# Patient Record
Sex: MALE | Race: WHITE | NOT HISPANIC OR LATINO
[De-identification: names, ages, dates, MRNs, and addresses within clinical notes are randomized per-mention and may not be internally consistent; named-entity substitution may affect disease eponyms.]

---

## 2019-02-17 ENCOUNTER — FORM ENCOUNTER (OUTPATIENT)
Age: 59
End: 2019-02-17

## 2019-02-18 ENCOUNTER — APPOINTMENT (OUTPATIENT)
Dept: INTERNAL MEDICINE | Facility: CLINIC | Age: 59
End: 2019-02-18
Payer: COMMERCIAL

## 2019-02-18 ENCOUNTER — APPOINTMENT (OUTPATIENT)
Dept: RADIOLOGY | Facility: CLINIC | Age: 59
End: 2019-02-18

## 2019-02-18 ENCOUNTER — OUTPATIENT (OUTPATIENT)
Dept: OUTPATIENT SERVICES | Facility: HOSPITAL | Age: 59
LOS: 1 days | End: 2019-02-18
Payer: COMMERCIAL

## 2019-02-18 ENCOUNTER — APPOINTMENT (OUTPATIENT)
Dept: ULTRASOUND IMAGING | Facility: CLINIC | Age: 59
End: 2019-02-18

## 2019-02-18 VITALS
SYSTOLIC BLOOD PRESSURE: 130 MMHG | OXYGEN SATURATION: 97 % | TEMPERATURE: 98.1 F | RESPIRATION RATE: 16 BRPM | WEIGHT: 238 LBS | HEART RATE: 80 BPM | HEIGHT: 69 IN | DIASTOLIC BLOOD PRESSURE: 78 MMHG | BODY MASS INDEX: 35.25 KG/M2

## 2019-02-18 DIAGNOSIS — I51.7 CARDIOMEGALY: ICD-10-CM

## 2019-02-18 DIAGNOSIS — I37.1 NONRHEUMATIC PULMONARY VALVE INSUFFICIENCY: ICD-10-CM

## 2019-02-18 DIAGNOSIS — I45.10 UNSPECIFIED RIGHT BUNDLE-BRANCH BLOCK: ICD-10-CM

## 2019-02-18 DIAGNOSIS — Z00.8 ENCOUNTER FOR OTHER GENERAL EXAMINATION: ICD-10-CM

## 2019-02-18 DIAGNOSIS — E66.2 MORBID (SEVERE) OBESITY WITH ALVEOLAR HYPOVENTILATION: ICD-10-CM

## 2019-02-18 DIAGNOSIS — I07.1 RHEUMATIC TRICUSPID INSUFFICIENCY: ICD-10-CM

## 2019-02-18 DIAGNOSIS — Z78.9 OTHER SPECIFIED HEALTH STATUS: ICD-10-CM

## 2019-02-18 DIAGNOSIS — N20.0 CALCULUS OF KIDNEY: ICD-10-CM

## 2019-02-18 DIAGNOSIS — Z87.19 PERSONAL HISTORY OF OTHER DISEASES OF THE DIGESTIVE SYSTEM: ICD-10-CM

## 2019-02-18 DIAGNOSIS — Z83.79 FAMILY HISTORY OF OTHER DISEASES OF THE DIGESTIVE SYSTEM: ICD-10-CM

## 2019-02-18 DIAGNOSIS — R10.9 UNSPECIFIED ABDOMINAL PAIN: ICD-10-CM

## 2019-02-18 DIAGNOSIS — Z87.898 PERSONAL HISTORY OF OTHER SPECIFIED CONDITIONS: ICD-10-CM

## 2019-02-18 DIAGNOSIS — R17 UNSPECIFIED JAUNDICE: ICD-10-CM

## 2019-02-18 LAB
BILIRUB UR QL STRIP: NEGATIVE
CLARITY UR: CLEAR
COLLECTION METHOD: NORMAL
GLUCOSE UR-MCNC: NORMAL
HCG UR QL: 2 EU/DL
HGB UR QL STRIP.AUTO: NORMAL
KETONES UR-MCNC: NEGATIVE
LEUKOCYTE ESTERASE UR QL STRIP: NORMAL
NITRITE UR QL STRIP: NEGATIVE
PH UR STRIP: 6
PROT UR STRIP-MCNC: NEGATIVE
SP GR UR STRIP: 1.02

## 2019-02-18 PROCEDURE — 93970 EXTREMITY STUDY: CPT

## 2019-02-18 PROCEDURE — 81003 URINALYSIS AUTO W/O SCOPE: CPT | Mod: QW

## 2019-02-18 PROCEDURE — 76700 US EXAM ABDOM COMPLETE: CPT | Mod: 26

## 2019-02-18 PROCEDURE — 99214 OFFICE O/P EST MOD 30 MIN: CPT | Mod: 25

## 2019-02-18 PROCEDURE — 93970 EXTREMITY STUDY: CPT | Mod: 26

## 2019-02-18 PROCEDURE — 72070 X-RAY EXAM THORAC SPINE 2VWS: CPT | Mod: 26

## 2019-02-18 PROCEDURE — 76700 US EXAM ABDOM COMPLETE: CPT

## 2019-02-18 PROCEDURE — 72070 X-RAY EXAM THORAC SPINE 2VWS: CPT

## 2019-02-18 NOTE — COUNSELING
[Healthy eating counseling provided] : healthy eating [Patient motivation] : Patient motivation [Needs reinforcement, provided] : Patient needs reinforcement on understanding lifestyle changes and  the steps needed to achieve self management goals and reinforcement was provided

## 2019-02-18 NOTE — PLAN
[FreeTextEntry1] : - patient with right sided flank pain, and hematuria- advised patient the need for a CT scan of the abdomen and pelvis. Patient refused to have a CAT scan done which only agree to do an ultrasound of the abdomen. Patient was educated that the CAT scan is more sensitive for picking up renal colic.\par \par GI-fatty liver-patient has signs of jaundice-patient states this is a chronic condition. Advised again for patient to have a CT of the abdomen with and without contrast. Patient refused. Did send patient for an abdominal sonogram. Patient also refused blood test today states he seen his hematologist on Wednesday who does his blood work. Patient was given a laboratory requisition to have completed on Wednesday at his hematologist.\par Patient states the pain comes and goes but is relieved with Tylenol and using the TENS unit. Advised patient to continue with current therapy pending results of the sonogram of the abdomen\par advised weight loss.  \par Cardiovascular-review of echocardiogram done in 2018 which showed severe left atrial enlargement, left ventricular hypertrophy, tricuspid and pulmonic regurgitation with intraseptal hypertrophy. Patient did have a good ejection fraction.  Patient sees Dr. Byrd.\par Patient with significant peripheral edema. Patient states Dr. Byrd advised him that from standing on his feet all day long. Advise patient to go for a venous Doppler of bilateral lower extremities. Also ordered a BNP to be drawn.  \par Orthopedic-mid back pain the patient with significant kyphosis- ordered a thoracic spine x-ray.

## 2019-02-18 NOTE — HISTORY OF PRESENT ILLNESS
[___ Days ago] : [unfilled] days ago [Episodic] : episodic [Severe] : severe [Ice] : ice [Heat] : heat [OTC Remedies] : OTC remedies [Activity] : with activity [Stable] : stable [Spouse] : spouse [de-identified] : Flank pain  [FreeTextEntry7] : flank to mid back  area  [FreeTextEntry5] : took Tylenol with relief, TENS unit  [FreeTextEntry4] : urination  [FreeTextEntry8] : Janessa scale  1-10 is 10 when it is active.  \par Patient states that he had kidney stones in the past and then felt similar symptoms. Patient denies any recent trauma to the back area.\par Patient denied any chest pain, shortness of breath, blood in the urine, pain with urination, increased frequency of urination. States he is to drink a lot of fluids and his urine looks clear.

## 2019-02-18 NOTE — PHYSICAL EXAM
[No Acute Distress] : no acute distress [Well Nourished] : well nourished [Well Developed] : well developed [Well-Appearing] : well-appearing [Normal Sclera/Conjunctiva] : normal sclera/conjunctiva [PERRL] : pupils equal round and reactive to light [EOMI] : extraocular movements intact [Normal Outer Ear/Nose] : the outer ears and nose were normal in appearance [Normal Oropharynx] : the oropharynx was normal [No JVD] : no jugular venous distention [Supple] : supple [No Lymphadenopathy] : no lymphadenopathy [Thyroid Normal, No Nodules] : the thyroid was normal and there were no nodules present [No Respiratory Distress] : no respiratory distress  [Clear to Auscultation] : lungs were clear to auscultation bilaterally [No Accessory Muscle Use] : no accessory muscle use [Normal Rate] : normal rate  [Regular Rhythm] : with a regular rhythm [No Carotid Bruits] : no carotid bruits [No Abdominal Bruit] : a ~M bruit was not heard ~T in the abdomen [No Varicosities] : no varicosities [Pedal Pulses Present] : the pedal pulses are present [No Extremity Clubbing/Cyanosis] : no extremity clubbing/cyanosis [No Palpable Aorta] : no palpable aorta [Soft] : abdomen soft [Non Tender] : non-tender [Non-distended] : non-distended [No Masses] : no abdominal mass palpated [No HSM] : no HSM [Normal Bowel Sounds] : normal bowel sounds [Normal Posterior Cervical Nodes] : no posterior cervical lymphadenopathy [Normal Anterior Cervical Nodes] : no anterior cervical lymphadenopathy [No CVA Tenderness] : no CVA  tenderness [No Spinal Tenderness] : no spinal tenderness [Kyphosis] : kyphosis [No Joint Swelling] : no joint swelling [Grossly Normal Strength/Tone] : grossly normal strength/tone [No Rash] : no rash [Jaundice] : jaundice [Normal Gait] : normal gait [Coordination Grossly Intact] : coordination grossly intact [No Focal Deficits] : no focal deficits [Deep Tendon Reflexes (DTR)] : deep tendon reflexes were 2+ and symmetric [Normal Affect] : the affect was normal [Normal Insight/Judgement] : insight and judgment were intact [de-identified] : sclera is  jaundice  [de-identified] : S1 S2 reg with murmur RSB  [de-identified] : 3 plus edema of lower ext

## 2019-02-20 LAB — BACTERIA UR CULT: NORMAL

## 2019-02-22 ENCOUNTER — RECORD ABSTRACTING (OUTPATIENT)
Age: 59
End: 2019-02-22

## 2019-02-22 DIAGNOSIS — Z78.9 OTHER SPECIFIED HEALTH STATUS: ICD-10-CM

## 2019-02-22 LAB
ALBUMIN SERPL ELPH-MCNC: 3.6 G/DL
ALP BLD-CCNC: 96 U/L
ALT SERPL-CCNC: 36 U/L
AMYLASE/CREAT SERPL: 51 U/L
ANION GAP SERPL CALC-SCNC: 19 MMOL/L
AST SERPL-CCNC: 55 U/L
BILIRUB SERPL-MCNC: 5.1 MG/DL
BUN SERPL-MCNC: 16 MG/DL
CALCIUM SERPL-MCNC: 8.9 MG/DL
CHLORIDE SERPL-SCNC: 107 MMOL/L
CO2 SERPL-SCNC: 20 MMOL/L
CREAT SERPL-MCNC: 0.69 MG/DL
GLUCOSE SERPL-MCNC: 117 MG/DL
LPL SERPL-CCNC: 35 U/L
NT-PROBNP SERPL-MCNC: 184 PG/ML
POTASSIUM SERPL-SCNC: 4.2 MMOL/L
PROT SERPL-MCNC: 6.9 G/DL
SODIUM SERPL-SCNC: 145 MMOL/L

## 2019-02-23 ENCOUNTER — APPOINTMENT (OUTPATIENT)
Dept: ULTRASOUND IMAGING | Facility: CLINIC | Age: 59
End: 2019-02-23

## 2019-02-23 LAB
BASOPHILS # BLD AUTO: 0.04 K/UL
BASOPHILS NFR BLD AUTO: 0.9 %
EOSINOPHIL # BLD AUTO: 0.27 K/UL
EOSINOPHIL NFR BLD AUTO: 6.1 %
HCT VFR BLD CALC: 34.7 %
HGB BLD-MCNC: 11.8 G/DL
IMM GRANULOCYTES NFR BLD AUTO: 0.5 %
LYMPHOCYTES # BLD AUTO: 0.92 K/UL
LYMPHOCYTES NFR BLD AUTO: 20.8 %
MAN DIFF?: NORMAL
MCHC RBC-ENTMCNC: 34 GM/DL
MCHC RBC-ENTMCNC: 37.6 PG
MCV RBC AUTO: 110.5 FL
MONOCYTES # BLD AUTO: 0.4 K/UL
MONOCYTES NFR BLD AUTO: 9 %
NEUTROPHILS # BLD AUTO: 2.78 K/UL
NEUTROPHILS NFR BLD AUTO: 62.7 %
PLATELET # BLD AUTO: 70 K/UL
RBC # BLD: 3.14 M/UL
RBC # FLD: 16.8 %
WBC # FLD AUTO: 4.59 K/UL

## 2019-02-24 ENCOUNTER — EMERGENCY (EMERGENCY)
Facility: HOSPITAL | Age: 59
LOS: 1 days | Discharge: HOME | End: 2019-02-24
Attending: EMERGENCY MEDICINE
Payer: COMMERCIAL

## 2019-02-24 VITALS
HEART RATE: 84 BPM | WEIGHT: 229.94 LBS | DIASTOLIC BLOOD PRESSURE: 76 MMHG | SYSTOLIC BLOOD PRESSURE: 127 MMHG | OXYGEN SATURATION: 97 % | RESPIRATION RATE: 20 BRPM | HEIGHT: 68 IN | TEMPERATURE: 98 F

## 2019-02-24 VITALS
HEART RATE: 84 BPM | TEMPERATURE: 99 F | SYSTOLIC BLOOD PRESSURE: 125 MMHG | RESPIRATION RATE: 17 BRPM | DIASTOLIC BLOOD PRESSURE: 78 MMHG | OXYGEN SATURATION: 98 %

## 2019-02-24 LAB
ALBUMIN SERPL ELPH-MCNC: 3.5 G/DL — SIGNIFICANT CHANGE UP (ref 3.3–5)
ALP SERPL-CCNC: 82 U/L — SIGNIFICANT CHANGE UP (ref 40–120)
ALT FLD-CCNC: 33 U/L — SIGNIFICANT CHANGE UP (ref 10–45)
ANION GAP SERPL CALC-SCNC: 13 MMOL/L — SIGNIFICANT CHANGE UP (ref 5–17)
APPEARANCE UR: CLEAR — SIGNIFICANT CHANGE UP
AST SERPL-CCNC: 51 U/L — HIGH (ref 10–40)
BACTERIA # UR AUTO: NEGATIVE — SIGNIFICANT CHANGE UP
BASOPHILS # BLD AUTO: 0 K/UL — SIGNIFICANT CHANGE UP (ref 0–0.2)
BASOPHILS NFR BLD AUTO: 0.6 % — SIGNIFICANT CHANGE UP (ref 0–2)
BILIRUB SERPL-MCNC: 8.3 MG/DL — HIGH (ref 0.2–1.2)
BILIRUB UR-MCNC: NEGATIVE — SIGNIFICANT CHANGE UP
BUN SERPL-MCNC: 19 MG/DL — SIGNIFICANT CHANGE UP (ref 7–23)
CALCIUM SERPL-MCNC: 8.9 MG/DL — SIGNIFICANT CHANGE UP (ref 8.4–10.5)
CHLORIDE SERPL-SCNC: 99 MMOL/L — SIGNIFICANT CHANGE UP (ref 96–108)
CO2 SERPL-SCNC: 23 MMOL/L — SIGNIFICANT CHANGE UP (ref 22–31)
COLOR SPEC: ABNORMAL
CREAT SERPL-MCNC: 1.08 MG/DL — SIGNIFICANT CHANGE UP (ref 0.5–1.3)
DIFF PNL FLD: ABNORMAL
EOSINOPHIL # BLD AUTO: 0.3 K/UL — SIGNIFICANT CHANGE UP (ref 0–0.5)
EOSINOPHIL NFR BLD AUTO: 3.3 % — SIGNIFICANT CHANGE UP (ref 0–6)
EPI CELLS # UR: 0 /HPF — SIGNIFICANT CHANGE UP
GAS PNL BLDV: SIGNIFICANT CHANGE UP
GLUCOSE SERPL-MCNC: 95 MG/DL — SIGNIFICANT CHANGE UP (ref 70–99)
GLUCOSE UR QL: NEGATIVE — SIGNIFICANT CHANGE UP
HCT VFR BLD CALC: 31.3 % — LOW (ref 39–50)
HGB BLD-MCNC: 11.3 G/DL — LOW (ref 13–17)
HYALINE CASTS # UR AUTO: 0 /LPF — SIGNIFICANT CHANGE UP (ref 0–2)
KETONES UR-MCNC: NEGATIVE — SIGNIFICANT CHANGE UP
LEUKOCYTE ESTERASE UR-ACNC: ABNORMAL
LIDOCAIN IGE QN: 22 U/L — SIGNIFICANT CHANGE UP (ref 7–60)
LYMPHOCYTES # BLD AUTO: 1.2 K/UL — SIGNIFICANT CHANGE UP (ref 1–3.3)
LYMPHOCYTES # BLD AUTO: 15.9 % — SIGNIFICANT CHANGE UP (ref 13–44)
MCHC RBC-ENTMCNC: 36.1 GM/DL — HIGH (ref 32–36)
MCHC RBC-ENTMCNC: 38.3 PG — HIGH (ref 27–34)
MCV RBC AUTO: 106 FL — HIGH (ref 80–100)
MONOCYTES # BLD AUTO: 0.8 K/UL — SIGNIFICANT CHANGE UP (ref 0–0.9)
MONOCYTES NFR BLD AUTO: 10.3 % — SIGNIFICANT CHANGE UP (ref 2–14)
NEUTROPHILS # BLD AUTO: 5.3 K/UL — SIGNIFICANT CHANGE UP (ref 1.8–7.4)
NEUTROPHILS NFR BLD AUTO: 69.8 % — SIGNIFICANT CHANGE UP (ref 43–77)
NITRITE UR-MCNC: NEGATIVE — SIGNIFICANT CHANGE UP
PH UR: 6.5 — SIGNIFICANT CHANGE UP (ref 5–8)
PLATELET # BLD AUTO: 65 K/UL — LOW (ref 150–400)
POTASSIUM SERPL-MCNC: 3.7 MMOL/L — SIGNIFICANT CHANGE UP (ref 3.5–5.3)
POTASSIUM SERPL-SCNC: 3.7 MMOL/L — SIGNIFICANT CHANGE UP (ref 3.5–5.3)
PROT SERPL-MCNC: 6.8 G/DL — SIGNIFICANT CHANGE UP (ref 6–8.3)
PROT UR-MCNC: ABNORMAL
RBC # BLD: 2.95 M/UL — LOW (ref 4.2–5.8)
RBC # FLD: 15.3 % — HIGH (ref 10.3–14.5)
RBC CASTS # UR COMP ASSIST: 26 /HPF — HIGH (ref 0–4)
SODIUM SERPL-SCNC: 135 MMOL/L — SIGNIFICANT CHANGE UP (ref 135–145)
SP GR SPEC: 1.01 — LOW (ref 1.01–1.02)
UROBILINOGEN FLD QL: ABNORMAL
WBC # BLD: 7.6 K/UL — SIGNIFICANT CHANGE UP (ref 3.8–10.5)
WBC # FLD AUTO: 7.6 K/UL — SIGNIFICANT CHANGE UP (ref 3.8–10.5)
WBC UR QL: 36 /HPF — HIGH (ref 0–5)

## 2019-02-24 PROCEDURE — 82803 BLOOD GASES ANY COMBINATION: CPT

## 2019-02-24 PROCEDURE — 82435 ASSAY OF BLOOD CHLORIDE: CPT

## 2019-02-24 PROCEDURE — 82330 ASSAY OF CALCIUM: CPT

## 2019-02-24 PROCEDURE — 81001 URINALYSIS AUTO W/SCOPE: CPT

## 2019-02-24 PROCEDURE — 87086 URINE CULTURE/COLONY COUNT: CPT

## 2019-02-24 PROCEDURE — 85027 COMPLETE CBC AUTOMATED: CPT

## 2019-02-24 PROCEDURE — 99284 EMERGENCY DEPT VISIT MOD MDM: CPT

## 2019-02-24 PROCEDURE — 74176 CT ABD & PELVIS W/O CONTRAST: CPT | Mod: 26

## 2019-02-24 PROCEDURE — 83690 ASSAY OF LIPASE: CPT

## 2019-02-24 PROCEDURE — 84132 ASSAY OF SERUM POTASSIUM: CPT

## 2019-02-24 PROCEDURE — 80053 COMPREHEN METABOLIC PANEL: CPT

## 2019-02-24 PROCEDURE — 74176 CT ABD & PELVIS W/O CONTRAST: CPT

## 2019-02-24 PROCEDURE — 85014 HEMATOCRIT: CPT

## 2019-02-24 PROCEDURE — 82947 ASSAY GLUCOSE BLOOD QUANT: CPT

## 2019-02-24 PROCEDURE — 84295 ASSAY OF SERUM SODIUM: CPT

## 2019-02-24 PROCEDURE — 83605 ASSAY OF LACTIC ACID: CPT

## 2019-02-24 RX ORDER — OXYCODONE HYDROCHLORIDE 5 MG/1
1 TABLET ORAL
Qty: 12 | Refills: 0
Start: 2019-02-24 | End: 2019-02-26

## 2019-02-24 RX ORDER — TAMSULOSIN HYDROCHLORIDE 0.4 MG/1
1 CAPSULE ORAL
Qty: 7 | Refills: 0
Start: 2019-02-24 | End: 2019-03-02

## 2019-02-24 NOTE — ED PROVIDER NOTE - PROGRESS NOTE DETAILS
CT shows 5mm UVJ stone. pt feels well, tolerating PO. wants to go home. will follow up with urology.

## 2019-02-24 NOTE — ED ADULT NURSE NOTE - OBJECTIVE STATEMENT
59 y.o M arrived to ED c/o R sided flank pain intermittent x1week. A&Ox3, ambulatory. has h/o of kidney stones in the past, states pain feels similar. no N/V/D. no fevers. respirations nonlabored pt in no acute distress. endorses intermittent pain radiation to abdomen. endorses "occasional" pain with urination. + R sided CVA tenderness. neuro intact able to move all extremities. Denies CP, SOB, HA, dizziness. Safety and comfort provided/maintained. Spouse at bedside

## 2019-02-24 NOTE — ED PROVIDER NOTE - OBJECTIVE STATEMENT
Keily Del Valle M.D: 59M hx surya (baseline bili 5) and essential thrombocytopenia (baseline plt 70) p/w 1 week of intermittent right flank pain radiating to rlq associated with nausea and chills. has also had hematuria with this but no dysuria no fveers. saw pmd has renal sono which was normal and has urine showing hematuria but no infection. has been taking tylenol for pain with some relief. feels similar to prior kidney stone he has had in the past.

## 2019-02-24 NOTE — ED PROVIDER NOTE - ATTENDING CONTRIBUTION TO CARE
****ATTENDING**** 58yo male hx listed presents with R flank pain x 1 week. Pain is sharp and dull and intermittent in nature. +nausea . Nml bms. No fever or chills. Pt saw his PCP had outpt renal sono wo any findings.   On exam, Patient is awake,alert,oriented x 3. Patient is well appearing and in no acute distress. Patient's chest is clear to ausculation, +s1s2. Abdomen is soft nd/nt +BS. Extremity with no swelling or calf tenderness. R cvat mild tender.  Check labs, CT and UA to eval for stone. Pt declined pain meds.

## 2019-02-24 NOTE — ED PROVIDER NOTE - NSFOLLOWUPINSTRUCTIONS_ED_ALL_ED_FT
take tylenol 975mg every 6 hours for pain  take oxycodone as needed up to 4 times a day for break through pain  take flomax nightly  stay well hydrated  return to er for new or worsening symptoms  follow up with urology- dr danielson.

## 2019-02-24 NOTE — ED PROVIDER NOTE - TEMPLATE, MLM
[>50% of Time Spent on Counseling for ____] : Greater than 50% of the encounter time was spent on counseling for [unfilled] [Time Spent: ___ minutes] : I have spent [unfilled] minutes of face to face time with the patient Abdominal Pain, N/V/D

## 2019-02-24 NOTE — ED PROVIDER NOTE - PHYSICAL EXAMINATION
Keily Del Valle M.D.:   patient awake alert NAD .   LUNGS CTAB no wheeze no crackle.   CARD RRR no m/r/g.    Abdomen soft minimal ruq tenderness without mohr sign ND no rebound no guarding +R CVAT.   EXT WWP no edema no calf tenderness CV 2+DP/PT bilaterally.   neuro A&Ox3 gait normal.    skin warm and dry no rash +jaundice  HEENT: moist mucous membranes, PERRL, EOMI

## 2019-02-25 LAB
CULTURE RESULTS: NO GROWTH — SIGNIFICANT CHANGE UP
SPECIMEN SOURCE: SIGNIFICANT CHANGE UP

## 2019-02-27 ENCOUNTER — APPOINTMENT (OUTPATIENT)
Dept: UROLOGY | Facility: CLINIC | Age: 59
End: 2019-02-27
Payer: COMMERCIAL

## 2019-02-27 PROBLEM — E80.4 GILBERT SYNDROME: Chronic | Status: ACTIVE | Noted: 2019-02-24

## 2019-02-27 LAB
BILIRUB UR QL STRIP: NORMAL
CLARITY UR: NORMAL
COLLECTION METHOD: NORMAL
GLUCOSE UR-MCNC: NEGATIVE
HCG UR QL: 4 EU/DL
HGB UR QL STRIP.AUTO: NORMAL
KETONES UR-MCNC: NEGATIVE
LEUKOCYTE ESTERASE UR QL STRIP: NEGATIVE
NITRITE UR QL STRIP: NEGATIVE
PH UR STRIP: 6
PROT UR STRIP-MCNC: NORMAL
SP GR UR STRIP: 1.02

## 2019-02-27 PROCEDURE — 99203 OFFICE O/P NEW LOW 30 MIN: CPT

## 2019-02-27 NOTE — HISTORY OF PRESENT ILLNESS
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  Third stone episode-  approx 20 years apart.  No additional stones.  Passing some gravel.\par \par Doing well now.  No fever.  No sig urge/frequency.\par  [Flank Pain] : flank pain

## 2019-02-27 NOTE — REVIEW OF SYSTEMS
[Eyesight Problems] : eyesight problems [Negative] : Heme/Lymph [Seen by urologist before (Name)  ___] : Preciously seen by a urologist: [unfilled] [Told you have blood in urine on a urine test] : told blood was present in a urine test [History of kidney stones] : history of kidney stones [Wake up at night to urinate  How many times?  ___] : wakes up to urinate [unfilled] times during the night

## 2019-02-27 NOTE — ASSESSMENT
[FreeTextEntry1] : Films reviewed.\par I had long discussion with patient about their stones, and about options, risks, and benefits of all treatments.  Main options for definitive stone treatment include ESWL, URS, PCNL.  \par \par ESWL success best with smaller, less dense stones, and with short skin to stone distance and favorable location of the stone within the urinary tract, while URS is more successful treatment with multiple stones, more dense stones, or challenging body habitus or stone location.  PCNL is best option for larger, more dense and complex stones, and particularly those involving the lower pole.  Non-definitive stone treatment options for drainage, using either stents or nephrostomy, also reviewed: these are of lower immediate surgical risks, but incur multiple procedures to manage and may have their own complications and effects on quality of life.  Still, nephrostomy or nephroureteral catheter can allow maintenance of urinary system drainage without surgical risks, and management in office with exchanges (avoiding the anesthesia and testing which would be present with bilateral internal stent exchange).\par \par Risks of nontreatment with obstruction can lead to very high rate of renal function loss in stone-bearing kidney over the next months to years.\par \par In this patient's case, I recommend... observation for spont passage, or URS.\par \par Risks/benefits/success/recovery expectations all reviewed at length, particularly with respect to patient's comorbidities, and inclusive of infection/sepsis, bleeding, need for secondary procedures or secondary stages such as embolization or open surgery, and even risks of death due to acute issues superimposed on comorbidities.\par \par Pt prefers to undergo: spont passage.\par \par Cont flomax.\par Urine culture\par Will observe at this time- RTC approx 3 weeks, will plan repeat imaging based on whether stone passes observed, or not

## 2019-02-27 NOTE — PHYSICAL EXAM
[General Appearance - Well Developed] : well developed [General Appearance - Well Nourished] : well nourished [Normal Appearance] : normal appearance [Well Groomed] : well groomed [General Appearance - In No Acute Distress] : no acute distress [Edema] : no peripheral edema [Respiration, Rhythm And Depth] : normal respiratory rhythm and effort [Exaggerated Use Of Accessory Muscles For Inspiration] : no accessory muscle use [Abdomen Soft] : soft [Abdomen Tenderness] : non-tender [Urinary Bladder Findings] : the bladder was normal on palpation [FreeTextEntry1] : remainder  [Normal Station and Gait] : the gait and station were normal for the patient's age [] : no rash [No Focal Deficits] : no focal deficits [Oriented To Time, Place, And Person] : oriented to person, place, and time [Affect] : the affect was normal [Mood] : the mood was normal [Not Anxious] : not anxious [No Palpable Adenopathy] : no palpable adenopathy

## 2019-03-01 LAB — BACTERIA UR CULT: NORMAL

## 2019-03-04 ENCOUNTER — APPOINTMENT (OUTPATIENT)
Dept: UROLOGY | Facility: CLINIC | Age: 59
End: 2019-03-04

## 2019-03-06 ENCOUNTER — APPOINTMENT (OUTPATIENT)
Dept: INTERNAL MEDICINE | Facility: CLINIC | Age: 59
End: 2019-03-06
Payer: COMMERCIAL

## 2019-03-06 VITALS
TEMPERATURE: 98.2 F | DIASTOLIC BLOOD PRESSURE: 78 MMHG | OXYGEN SATURATION: 97 % | BODY MASS INDEX: 34.56 KG/M2 | HEIGHT: 68 IN | HEART RATE: 86 BPM | WEIGHT: 228 LBS | RESPIRATION RATE: 16 BRPM | SYSTOLIC BLOOD PRESSURE: 120 MMHG

## 2019-03-06 DIAGNOSIS — R19.7 DIARRHEA, UNSPECIFIED: ICD-10-CM

## 2019-03-06 PROCEDURE — 99214 OFFICE O/P EST MOD 30 MIN: CPT

## 2019-03-06 NOTE — ASSESSMENT
[FreeTextEntry1] : Patient is a 59 year old male with a past medical history as below who follows up after ER visit for renal colic and gastroenteritis.

## 2019-03-06 NOTE — PHYSICAL EXAM

## 2019-03-06 NOTE — HISTORY OF PRESENT ILLNESS
[FreeTextEntry1] : follow-up after ER visit for kidney stone, Tamsulosin refill [de-identified] : Patient is a 59 year old male with a past medical history as below who presents for follow up after ER visit for kidney stone attack. Patient recently went to the ER for right renal colic and a 4mm ureteral stone was found on ct scan.  Patient was started on Tamsulosin.  Patient is unsure if he passed the stone. Patient saw urologist Dr Banda who told him to wait 3-4 weeks to see if stone passes. If stone does not pass, patient may be scheduled for a procedure. Patient is hesitant to take Tylenol due to his liver and Advil due to thrombocytopenia. The patient sees hematologist Dr. Beth for leukopenia and thrombocytopenia. Patient has a history of fatty liver and elevated liver enzymes. The patient states that diarrhea with nausea for the last 3 days. His family also is experiencing similar symptoms.

## 2019-03-06 NOTE — ADDENDUM
[FreeTextEntry1] : I, Sridevi Aniket, acted solely as scribe for Dr. Portillo Mckeon DO on this date Mar  6 2019  8:30AM .\par \par All medical record entries made by the Scribe were at my, Dr. Portillo Mckeon DO direction and personally dictated by me on Mar  6 2019  8:30AM . I have reviewed the chart and agree that the record accurately reflects my personal performance of the history, physical exam, assessment and plan. I have also personally directed, reviewed and agreed with the chart.

## 2019-03-06 NOTE — PLAN
[FreeTextEntry1] : Urology\par nephrolithiasis - increase hydration and continue Tamsulosin 0.4mg, Rx filled - follow up with urology - advised patient can take Tylenol - patient may need medical clearance if surgery is necessary\par Gastroenterology \par gastroenteritis - note for work given\par fatty liver - follow up with gastroenterologist\par Hematology\par thrombocytopenia - follow up with hematologist

## 2019-03-25 ENCOUNTER — RX RENEWAL (OUTPATIENT)
Age: 59
End: 2019-03-25

## 2019-04-01 ENCOUNTER — TRANSCRIPTION ENCOUNTER (OUTPATIENT)
Age: 59
End: 2019-04-01

## 2019-04-03 ENCOUNTER — APPOINTMENT (OUTPATIENT)
Dept: INTERNAL MEDICINE | Facility: CLINIC | Age: 59
End: 2019-04-03
Payer: COMMERCIAL

## 2019-04-03 ENCOUNTER — FORM ENCOUNTER (OUTPATIENT)
Age: 59
End: 2019-04-03

## 2019-04-03 VITALS
HEART RATE: 86 BPM | BODY MASS INDEX: 35.52 KG/M2 | RESPIRATION RATE: 18 BRPM | TEMPERATURE: 98.2 F | DIASTOLIC BLOOD PRESSURE: 64 MMHG | SYSTOLIC BLOOD PRESSURE: 102 MMHG | OXYGEN SATURATION: 96 % | HEIGHT: 68 IN | WEIGHT: 234.38 LBS

## 2019-04-03 VITALS — SYSTOLIC BLOOD PRESSURE: 100 MMHG | DIASTOLIC BLOOD PRESSURE: 60 MMHG

## 2019-04-03 DIAGNOSIS — J45.909 UNSPECIFIED ASTHMA, UNCOMPLICATED: ICD-10-CM

## 2019-04-03 PROCEDURE — 99214 OFFICE O/P EST MOD 30 MIN: CPT

## 2019-04-03 NOTE — ASSESSMENT
[FreeTextEntry1] : Patient is a 59 year year old male with a past medical history as above who presents for cellulitis and reactive airway disease.

## 2019-04-03 NOTE — PLAN
[FreeTextEntry1] : Dermatology \par cellulitis- continue Augmentin 875mg .p.o.q.d. and Doxycycline Hyclate 100mg p.o.q.d. for 10 days, Rx filled - patient to follow up if symptoms persist\par Vascular\par pitting edema-start Furomeside 20mg p.o.qod, Rx filled - advised keeping legs elevated and starting zippered compression stockings\par Pulmonary\par reactive airway disease-start Albuterol Sulfate  mcg/act inhale 2 puffs every 6 hours as needed, Rx filled\par Urology\par kidney stones-continue Tamsulosin HCl 0.4mg p.o.q.d. - follow up with Dr. Hoenig\par Hematology \par thrombocytopenia-follow up with hematologist

## 2019-04-03 NOTE — ADDENDUM
[FreeTextEntry1] : I, Janes Shah, acted solely as scribe for Dr. Portillo Mckeon DO on this date 04/03/2019  7:00AM .\par \par All medical record entries made by the Scribe were at my, Dr. Portillo Mckeon DO direction and personally dictated by me on 04/03/2019  7:00AM. I have reviewed the chart and agree that the record accurately reflects my personal performance of the history, physical exam, assessment and plan. I have also personally directed, reviewed and agreed with the chart.\par

## 2019-04-03 NOTE — PHYSICAL EXAM

## 2019-04-03 NOTE — REVIEW OF SYSTEMS
[Negative] : Psychiatric [Dyspnea on Exertion] : dyspnea on exertion [Cough] : cough [Itching] : itching [de-identified] : cellulitis on right leg [de-identified] : lower extremity edema

## 2019-04-03 NOTE — HISTORY OF PRESENT ILLNESS
[FreeTextEntry1] : cellulitis, cough and general follow-up [de-identified] : Patient is a 59 year year old male with a past medical history as below who presents for cellulitis and cough. Patient states he noticed lower right leg redness, swelling, warmth and tenderness about 8 days ago and was started on Augmentin. Patient continues Augmentin and notes only a slight improvement. He also states that he has been scratching the swollen area through his socks occasionally. He states the symptoms progressively worsen towards the evening. Patient also states he believes he has passed his kidney stones, as he has not recently experienced any pain. He also states he has an upcoming appointment with urologist, Dr. Hoenig. Patient states Dr. Hoenig ordered a CAT scan. Patient states he has increased fluid intake and is currently on Flomax. Patient also states he sees his hematologist every 3 months to monitor WBC, hematocrit, and platelet count. Patient has a known history of thrombocytopenia. Patient also states he had an ECG 6 months ago. Patient also states he has been coughing excessively while at work, possibly due to cleaning supplies and air-conditioning and states he may have developed reactive airway disease. Patient also notes dyspnea on exertion.

## 2019-04-04 ENCOUNTER — OUTPATIENT (OUTPATIENT)
Dept: OUTPATIENT SERVICES | Facility: HOSPITAL | Age: 59
LOS: 1 days | End: 2019-04-04
Payer: COMMERCIAL

## 2019-04-04 ENCOUNTER — APPOINTMENT (OUTPATIENT)
Dept: CT IMAGING | Facility: IMAGING CENTER | Age: 59
End: 2019-04-04
Payer: COMMERCIAL

## 2019-04-04 DIAGNOSIS — R10.9 UNSPECIFIED ABDOMINAL PAIN: ICD-10-CM

## 2019-04-04 PROCEDURE — 74176 CT ABD & PELVIS W/O CONTRAST: CPT | Mod: 26

## 2019-04-04 PROCEDURE — 74176 CT ABD & PELVIS W/O CONTRAST: CPT

## 2019-04-05 ENCOUNTER — APPOINTMENT (OUTPATIENT)
Age: 59
End: 2019-04-05
Payer: COMMERCIAL

## 2019-04-05 DIAGNOSIS — N20.1 CALCULUS OF URETER: ICD-10-CM

## 2019-04-05 PROCEDURE — 99213 OFFICE O/P EST LOW 20 MIN: CPT

## 2019-04-05 NOTE — HISTORY OF PRESENT ILLNESS
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  Third stone episode-  approx 20 years apart.  No additional stones.  Passing some gravel.\par \par Doing well now.  No fever.  No sig urge/frequency.\par \par Thinks he passed stone\par

## 2019-04-05 NOTE — ASSESSMENT
[FreeTextEntry1] : CT scan 4/4/19---> resolved stone at right UVJ, no hydro. No other stone\par \par Diet modification reviewed at length- increasing fluids (primarily water), citrus is good, and decreasing/moderating salt, animal flesh protein, oxalate containing foods, and moderation of calcium intake (1000 mg/day is USRDA).\par \par I reviewed with the patient the risks of metabolic stone disease given their underlying risk parameters (all of which include large stones, multiple stones, bilateral stones, family history, and young age), and the indications for 24 hour urine metabolic assessment.\par \par We also discussed benefits of regular exercise and weight loss as independent risk reducers for stones.\par \par 1. Diet modification as discussed\par 2. 24 hour urine collection x 2 in the next few weeks\par 3. RTC with 2 months\par

## 2019-04-10 ENCOUNTER — RX RENEWAL (OUTPATIENT)
Age: 59
End: 2019-04-10

## 2019-04-30 ENCOUNTER — RX RENEWAL (OUTPATIENT)
Age: 59
End: 2019-04-30

## 2019-05-01 ENCOUNTER — APPOINTMENT (OUTPATIENT)
Dept: INTERNAL MEDICINE | Facility: CLINIC | Age: 59
End: 2019-05-01
Payer: COMMERCIAL

## 2019-05-01 ENCOUNTER — LABORATORY RESULT (OUTPATIENT)
Age: 59
End: 2019-05-01

## 2019-05-01 VITALS
WEIGHT: 233.5 LBS | BODY MASS INDEX: 35.39 KG/M2 | SYSTOLIC BLOOD PRESSURE: 118 MMHG | OXYGEN SATURATION: 97 % | DIASTOLIC BLOOD PRESSURE: 70 MMHG | HEART RATE: 78 BPM | HEIGHT: 68 IN | RESPIRATION RATE: 16 BRPM | TEMPERATURE: 98.5 F

## 2019-05-01 DIAGNOSIS — L03.119 CELLULITIS OF UNSPECIFIED PART OF LIMB: ICD-10-CM

## 2019-05-01 DIAGNOSIS — K76.0 FATTY (CHANGE OF) LIVER, NOT ELSEWHERE CLASSIFIED: ICD-10-CM

## 2019-05-01 PROCEDURE — 36415 COLL VENOUS BLD VENIPUNCTURE: CPT

## 2019-05-01 PROCEDURE — 99214 OFFICE O/P EST MOD 30 MIN: CPT | Mod: 25

## 2019-05-01 NOTE — REVIEW OF SYSTEMS
[Fatigue] : fatigue [Lower Ext Edema] : lower extremity edema [Itching] : itching [Abdominal Pain] : abdominal pain [Negative] : Psychiatric [FreeTextEntry5] : bilateral lower extremities [FreeTextEntry9] : small umbilical hernia  [FreeTextEntry7] : u [de-identified] : right lower extremity redness

## 2019-05-01 NOTE — PLAN
[FreeTextEntry1] : Vascular\par bilateral lower extremity edema - likely secondary to venous insufficiency and contribution from other undiagnosed medical issues -increased Furosemide 20mg p.o.q.d. for 10 days and then p.o.q.o.d. as symptoms improve-advised using compression stockings with zipper-advised keeping legs elevated-advised low sodium diet \par Gastroenterologist\par cirrhotic liver- follow up with gastroenterologist, Dr. Leal for consultation and discussion of possible liver biopsy, he was called an case was discussed with him \par Urology\par nephrolithiasis - advised increased fluid intake - follow up with urologist, Dr. Hoenig for 24 hour urine collection\par Cardiology\par abnormal Echocardiogram with dilated LV-follow up with cardiologist \par \par check male panel, B12, folic acid

## 2019-05-01 NOTE — HISTORY OF PRESENT ILLNESS
[Spouse] : spouse [FreeTextEntry1] : fasting blood work and general follow-up\par  [de-identified] : Patient is a 59 year year old male with a past medical history as below who presents for fasting blood work and general follow-up. Patient notes the swelling in his bilateral lower extremities has been progressively getting worse over the course of the past year.  He noted redness in his bilateral lower extremities yesterday, likely secondary to scratching his legs. Patient states he took Doxycycline for 7 days, but notes associated stomach pain. Patient also states he uses Neosporin occasionally for the scratching. He states he has not been taking Furosemide every day. Patient notes recently following up with urologist, Dr. Hoenig for kidney stones. Recent CAT scan revealed kidney stones passed and cirrhotic pattern of the liver. He notes he is staying well-hydrated. Patient states he has not seen his gastroenterologist recently. Patient notes a small umbilical hernia and states abdominal discomfort is exacerbated by coughing. Patient notes seeing cardiologist recently. Stress test was normal. EKG was abnormal.  Last blood work was done on 2/20/19.   \par

## 2019-05-01 NOTE — ADDENDUM
[FreeTextEntry1] : I, Janes Shah, acted solely as scribe for Dr. Portillo Mckeon DO on this date 05/01/2019  7:00AM .\par \par All medical record entries made by the Scribe were at my, Dr. Portillo Mckeon DO direction and personally dictated by me on 05/01/2019  7:00AM. I have reviewed the chart and agree that the record accurately reflects my personal performance of the history, physical exam, assessment and plan. I have also personally directed, reviewed and agreed with the chart.\par

## 2019-05-01 NOTE — PHYSICAL EXAM
[Well Nourished] : well nourished [Well Developed] : well developed [No Acute Distress] : no acute distress [Normal Sclera/Conjunctiva] : normal sclera/conjunctiva [Well-Appearing] : well-appearing [PERRL] : pupils equal round and reactive to light [Normal Outer Ear/Nose] : the outer ears and nose were normal in appearance [EOMI] : extraocular movements intact [No JVD] : no jugular venous distention [Normal Oropharynx] : the oropharynx was normal [Thyroid Normal, No Nodules] : the thyroid was normal and there were no nodules present [No Lymphadenopathy] : no lymphadenopathy [Supple] : supple [No Respiratory Distress] : no respiratory distress  [Clear to Auscultation] : lungs were clear to auscultation bilaterally [No Accessory Muscle Use] : no accessory muscle use [Normal Rate] : normal rate  [Regular Rhythm] : with a regular rhythm [Normal S1, S2] : normal S1 and S2 [No Murmur] : no murmur heard [No Abdominal Bruit] : a ~M bruit was not heard ~T in the abdomen [No Varicosities] : no varicosities [No Carotid Bruits] : no carotid bruits [Pedal Pulses Present] : the pedal pulses are present [No Extremity Clubbing/Cyanosis] : no extremity clubbing/cyanosis [Soft] : abdomen soft [No Palpable Aorta] : no palpable aorta [Non-distended] : non-distended [Non Tender] : non-tender [No HSM] : no HSM [No Masses] : no abdominal mass palpated [Normal Bowel Sounds] : normal bowel sounds [Normal Posterior Cervical Nodes] : no posterior cervical lymphadenopathy [Normal Anterior Cervical Nodes] : no anterior cervical lymphadenopathy [No Spinal Tenderness] : no spinal tenderness [No CVA Tenderness] : no CVA  tenderness [No Joint Swelling] : no joint swelling [Normal Gait] : normal gait [Grossly Normal Strength/Tone] : grossly normal strength/tone [No Rash] : no rash [No Focal Deficits] : no focal deficits [Deep Tendon Reflexes (DTR)] : deep tendon reflexes were 2+ and symmetric [Coordination Grossly Intact] : coordination grossly intact [Normal Insight/Judgement] : insight and judgment were intact [Normal Affect] : the affect was normal [de-identified] : 1-2+ pitting edema of bilateral lower extremities [de-identified] : obese, questionable small umbilical hernia  [de-identified] : small excoriations of right pretibial area; mild erythema of medial thigh

## 2019-05-06 LAB
25(OH)D3 SERPL-MCNC: 19.6 NG/ML
ALBUMIN SERPL ELPH-MCNC: 3.3 G/DL
ALP BLD-CCNC: 90 U/L
ALT SERPL-CCNC: 33 U/L
ANION GAP SERPL CALC-SCNC: 9 MMOL/L
AST SERPL-CCNC: 50 U/L
BASOPHILS # BLD AUTO: 0.06 K/UL
BASOPHILS NFR BLD AUTO: 1.4 %
BILIRUB SERPL-MCNC: 3.8 MG/DL
BUN SERPL-MCNC: 17 MG/DL
CALCIUM SERPL-MCNC: 9 MG/DL
CHLORIDE SERPL-SCNC: 109 MMOL/L
CHOLEST SERPL-MCNC: 135 MG/DL
CHOLEST/HDLC SERPL: 2 RATIO
CO2 SERPL-SCNC: 28 MMOL/L
CREAT SERPL-MCNC: 0.75 MG/DL
EOSINOPHIL # BLD AUTO: 0.28 K/UL
EOSINOPHIL NFR BLD AUTO: 6.7 %
ESTIMATED AVERAGE GLUCOSE: 74 MG/DL
FOLATE RBC-MCNC: 1619 NG/ML
GLUCOSE SERPL-MCNC: 114 MG/DL
HBA1C MFR BLD HPLC: 4.2 %
HCT VFR BLD CALC: 34.9 %
HCT VFR BLD CALC: 34.9 %
HDLC SERPL-MCNC: 67 MG/DL
HGB BLD-MCNC: 11.4 G/DL
IMM GRANULOCYTES NFR BLD AUTO: 0.2 %
LDLC SERPL CALC-MCNC: 57 MG/DL
LYMPHOCYTES # BLD AUTO: 0.87 K/UL
LYMPHOCYTES NFR BLD AUTO: 20.7 %
MAN DIFF?: NORMAL
MCHC RBC-ENTMCNC: 32.7 GM/DL
MCHC RBC-ENTMCNC: 36.4 PG
MCV RBC AUTO: 111.5 FL
MONOCYTES # BLD AUTO: 0.36 K/UL
MONOCYTES NFR BLD AUTO: 8.6 %
NEUTROPHILS # BLD AUTO: 2.62 K/UL
NEUTROPHILS NFR BLD AUTO: 62.4 %
PLATELET # BLD AUTO: 74 K/UL
POTASSIUM SERPL-SCNC: 4.3 MMOL/L
PROT SERPL-MCNC: 6.8 G/DL
PSA SERPL-MCNC: 0.18 NG/ML
RBC # BLD: 3.13 M/UL
RBC # FLD: 17.9 %
SODIUM SERPL-SCNC: 146 MMOL/L
TRIGL SERPL-MCNC: 57 MG/DL
TSH SERPL-ACNC: 3.95 UIU/ML
VIT B12 SERPL-MCNC: 683 PG/ML
WBC # FLD AUTO: 4.2 K/UL

## 2019-06-08 ENCOUNTER — OUTPATIENT (OUTPATIENT)
Dept: OUTPATIENT SERVICES | Facility: HOSPITAL | Age: 59
LOS: 1 days | End: 2019-06-08
Payer: COMMERCIAL

## 2019-06-08 ENCOUNTER — APPOINTMENT (OUTPATIENT)
Dept: CT IMAGING | Facility: CLINIC | Age: 59
End: 2019-06-08
Payer: COMMERCIAL

## 2019-06-08 DIAGNOSIS — Z00.8 ENCOUNTER FOR OTHER GENERAL EXAMINATION: ICD-10-CM

## 2019-06-08 PROCEDURE — 74177 CT ABD & PELVIS W/CONTRAST: CPT

## 2019-06-08 PROCEDURE — 74177 CT ABD & PELVIS W/CONTRAST: CPT | Mod: 26

## 2019-06-21 ENCOUNTER — APPOINTMENT (OUTPATIENT)
Dept: UROLOGY | Facility: CLINIC | Age: 59
End: 2019-06-21

## 2019-08-07 ENCOUNTER — APPOINTMENT (OUTPATIENT)
Dept: INTERNAL MEDICINE | Facility: CLINIC | Age: 59
End: 2019-08-07
Payer: COMMERCIAL

## 2019-08-07 ENCOUNTER — LABORATORY RESULT (OUTPATIENT)
Age: 59
End: 2019-08-07

## 2019-08-07 VITALS
SYSTOLIC BLOOD PRESSURE: 112 MMHG | DIASTOLIC BLOOD PRESSURE: 68 MMHG | RESPIRATION RATE: 16 BRPM | OXYGEN SATURATION: 97 % | WEIGHT: 229.38 LBS | HEIGHT: 68 IN | HEART RATE: 77 BPM | BODY MASS INDEX: 34.76 KG/M2 | TEMPERATURE: 97.6 F

## 2019-08-07 DIAGNOSIS — N20.0 CALCULUS OF KIDNEY: ICD-10-CM

## 2019-08-07 PROCEDURE — 99214 OFFICE O/P EST MOD 30 MIN: CPT | Mod: 25

## 2019-08-07 PROCEDURE — 36415 COLL VENOUS BLD VENIPUNCTURE: CPT

## 2019-08-07 NOTE — ASSESSMENT
[FreeTextEntry1] : Patient is a 59 year old male with a past medical history as above who presents for fasting blood work and general follow-up.\par

## 2019-08-07 NOTE — ADDENDUM
[FreeTextEntry1] : I, Janes Shah, acted solely as scribe for Dr. Portillo Mckeon DO on this date 08/07/2019  7:20AM .\par \par All medical record entries made by the Scribe were at my, Dr. Portillo Mckeon DO direction and personally dictated by me on 08/07/2019  7:20AM. I have reviewed the chart and agree that the record accurately reflects my personal performance of the history, physical exam, assessment and plan. I have also personally directed, reviewed and agreed with the chart.\par

## 2019-08-07 NOTE — HISTORY OF PRESENT ILLNESS
[FreeTextEntry1] : fasting blood work and general follow-up\par  [de-identified] : Patient is a 59 year old male with a past medical history as below who presents for fasting blood work and general follow-up. Patient notes being started on Spironolactone by gastroenterologist, Dr. Leal. He notes some improvement in bilateral lower extremity edema on Furosemide and Spironolactone, but notes increased urinary frequency. He states cellulitis has resolved. Patient notes recent testing revealed esophageal varices. He notes losing muscle mass and states he has been trying to increase protein intake. He notes muscle/bone pain when walking that resolves after stretching. Dr. Leal referred patient to liver specialist, Dr. Dinh who recommended evaluation for a possible liver transplant in the future. Patient notes abnormalities in most recent echocardiogram. He also notes a recent stress test. He notes an upcoming appointment with a pulmonologist at [facility]. He requests folic acid level be checked with blood work today.

## 2019-08-07 NOTE — REVIEW OF SYSTEMS
[Lower Ext Edema] : lower extremity edema [Frequency] : frequency [Negative] : Heme/Lymph [FreeTextEntry9] : losing muscle mass; bone pain in back and sternum

## 2019-08-07 NOTE — PLAN
[FreeTextEntry1] : Pulmonary\par reactive airway disease - continue Albuterol Sulfate  MCG/ACT as directed\par Cardiology\par abnormal echocardiogram - recommended follow-up with cardiologist \par Vascular\par bilateral lower extremity edema - likely secondary to venous insufficiency and contribution from other undiagnosed medical issues - continue Furosemide 40mg p.o.q.d. and Spironolactone 50mg p.o.q.d. - continue using zippered compression stockings  - continue keeping legs elevated - continue low sodium diet \par Endocrinology\par continue vitamin D 2000 unit capsules p.o.q.d. - check vitamin D \par obesity - continue low carbohydrate diet; recommended increased CV exercise given complaint of myalgia likely secondary to deconditioning\par Gastroenterologist\par cirrhotic liver - follow up liver specialist, Dr. Dinh \par Hematology\par continue folic Acid 0.8mg p.o.q.d. - check folic acid\par thrombocytopenia - check CBC\par Urology\par history of nephrolithiasis - continue increased fluid intake/staying well-hydrated - follow up with urologist, Dr. Hoenig as needed\par \par check male panel, folic acid

## 2019-08-07 NOTE — PHYSICAL EXAM
[No Acute Distress] : no acute distress [Well Nourished] : well nourished [Well Developed] : well developed [Well-Appearing] : well-appearing [Normal Sclera/Conjunctiva] : normal sclera/conjunctiva [PERRL] : pupils equal round and reactive to light [Normal Outer Ear/Nose] : the outer ears and nose were normal in appearance [EOMI] : extraocular movements intact [Normal Oropharynx] : the oropharynx was normal [No JVD] : no jugular venous distention [No Lymphadenopathy] : no lymphadenopathy [Supple] : supple [Thyroid Normal, No Nodules] : the thyroid was normal and there were no nodules present [No Respiratory Distress] : no respiratory distress  [No Accessory Muscle Use] : no accessory muscle use [Clear to Auscultation] : lungs were clear to auscultation bilaterally [Normal Rate] : normal rate  [Regular Rhythm] : with a regular rhythm [Normal S1, S2] : normal S1 and S2 [No Murmur] : no murmur heard [No Carotid Bruits] : no carotid bruits [No Abdominal Bruit] : a ~M bruit was not heard ~T in the abdomen [No Varicosities] : no varicosities [Pedal Pulses Present] : the pedal pulses are present [No Palpable Aorta] : no palpable aorta [No Extremity Clubbing/Cyanosis] : no extremity clubbing/cyanosis [Soft] : abdomen soft [Non Tender] : non-tender [Non-distended] : non-distended [No Masses] : no abdominal mass palpated [No HSM] : no HSM [Normal Bowel Sounds] : normal bowel sounds [Normal Posterior Cervical Nodes] : no posterior cervical lymphadenopathy [Normal Anterior Cervical Nodes] : no anterior cervical lymphadenopathy [No CVA Tenderness] : no CVA  tenderness [No Spinal Tenderness] : no spinal tenderness [No Joint Swelling] : no joint swelling [Grossly Normal Strength/Tone] : grossly normal strength/tone [No Rash] : no rash [Coordination Grossly Intact] : coordination grossly intact [No Focal Deficits] : no focal deficits [Normal Gait] : normal gait [Deep Tendon Reflexes (DTR)] : deep tendon reflexes were 2+ and symmetric [Normal Affect] : the affect was normal [Normal Insight/Judgement] : insight and judgment were intact [de-identified] : 2+ pitting edema of lower extremities bilaterally; no redness/warmth  [de-identified] : obese

## 2019-08-12 ENCOUNTER — RX RENEWAL (OUTPATIENT)
Age: 59
End: 2019-08-12

## 2019-08-12 LAB
25(OH)D3 SERPL-MCNC: 15.7 NG/ML
ALBUMIN SERPL ELPH-MCNC: 3.4 G/DL
ALP BLD-CCNC: 98 U/L
ALT SERPL-CCNC: 31 U/L
ANION GAP SERPL CALC-SCNC: 9 MMOL/L
AST SERPL-CCNC: 49 U/L
BASOPHILS # BLD AUTO: 0.04 K/UL
BASOPHILS NFR BLD AUTO: 1.1 %
BILIRUB SERPL-MCNC: 4.9 MG/DL
BUN SERPL-MCNC: 18 MG/DL
CALCIUM SERPL-MCNC: 8.6 MG/DL
CHLORIDE SERPL-SCNC: 104 MMOL/L
CHOLEST SERPL-MCNC: 139 MG/DL
CHOLEST/HDLC SERPL: 1.9 RATIO
CO2 SERPL-SCNC: 27 MMOL/L
CREAT SERPL-MCNC: 0.71 MG/DL
EOSINOPHIL # BLD AUTO: 0.2 K/UL
EOSINOPHIL NFR BLD AUTO: 5.7 %
ESTIMATED AVERAGE GLUCOSE: 71 MG/DL
GLUCOSE SERPL-MCNC: 107 MG/DL
HBA1C MFR BLD HPLC: 4.1 %
HCT VFR BLD CALC: 33.7 %
HDLC SERPL-MCNC: 72 MG/DL
HGB BLD-MCNC: 10.8 G/DL
IMM GRANULOCYTES NFR BLD AUTO: 0.6 %
LDLC SERPL CALC-MCNC: 56 MG/DL
LYMPHOCYTES # BLD AUTO: 0.78 K/UL
LYMPHOCYTES NFR BLD AUTO: 22.2 %
MAN DIFF?: NORMAL
MCHC RBC-ENTMCNC: 32 GM/DL
MCHC RBC-ENTMCNC: 36.9 PG
MCV RBC AUTO: 115 FL
MONOCYTES # BLD AUTO: 0.31 K/UL
MONOCYTES NFR BLD AUTO: 8.8 %
NEUTROPHILS # BLD AUTO: 2.17 K/UL
NEUTROPHILS NFR BLD AUTO: 61.6 %
PLATELET # BLD AUTO: 63 K/UL
POTASSIUM SERPL-SCNC: 4.3 MMOL/L
PROT SERPL-MCNC: 7 G/DL
PSA SERPL-MCNC: 0.14 NG/ML
RBC # BLD: 2.93 M/UL
RBC # FLD: 17.2 %
SODIUM SERPL-SCNC: 140 MMOL/L
TRIGL SERPL-MCNC: 55 MG/DL
TSH SERPL-ACNC: 4.85 UIU/ML
WBC # FLD AUTO: 3.52 K/UL

## 2019-11-06 ENCOUNTER — APPOINTMENT (OUTPATIENT)
Dept: INTERNAL MEDICINE | Facility: CLINIC | Age: 59
End: 2019-11-06
Payer: COMMERCIAL

## 2019-11-06 VITALS
RESPIRATION RATE: 16 BRPM | BODY MASS INDEX: 32.22 KG/M2 | HEART RATE: 76 BPM | DIASTOLIC BLOOD PRESSURE: 70 MMHG | OXYGEN SATURATION: 98 % | TEMPERATURE: 97.9 F | WEIGHT: 212.56 LBS | SYSTOLIC BLOOD PRESSURE: 114 MMHG | HEIGHT: 68 IN

## 2019-11-06 DIAGNOSIS — I85.00 ESOPHAGEAL VARICES W/OUT BLEEDING: ICD-10-CM

## 2019-11-06 PROCEDURE — 90686 IIV4 VACC NO PRSV 0.5 ML IM: CPT

## 2019-11-06 PROCEDURE — 99214 OFFICE O/P EST MOD 30 MIN: CPT | Mod: 25

## 2019-11-06 PROCEDURE — G0008: CPT

## 2019-11-08 PROBLEM — I85.00 ESOPHAGEAL VARICES: Status: ACTIVE | Noted: 2019-08-07

## 2019-11-08 NOTE — PLAN
[FreeTextEntry1] : Pulmonary\par reactive airway disease - continue Albuterol Sulfate  MCG/ACT as directed\par Cardiology\par continue Nadolol 20mg p.o.q.d.\par Follow up with interventional cardiologist for coronary angiogram with calcified coronary arteries and positive nuclear stress test-has some degree of CAD, further investigation will reveal extent of this, of note he has SANCHEZ when walking\par Vascular\par bilateral lower extremity edema - likely secondary to venous insufficiency and contribution from cirrhosis - continue Furosemide 40mg p.o.q.d. and Spironolactone 50mg p.o.q.d. - continue using zippered compression stockings - continue keeping legs elevated - continue low sodium diet \par Endocrinology\par obesity - continue low carbohydrate diet and CV exercise\par low testosterone - recommended following up with endocrinologist for further evaluation, for treatment with testosterone is increased risk of hypercoaguable state \par Musculoskeletal\par osteoporosis - patient to follow up with endocrinologist to further discuss starting medication  \par Gastroenterology\par cirrhotic liver - continue Xifaxan 550mg BID p.o.q.d. as directed - follow up liver specialist, Dr. Dinh \par Urology\par history of nephrolithiasis - continue staying well-hydrated - follow up with urologist, Dr. Hoenig as needed\par Infectious Disease\par flu vaccine - Fluzone Quadrivalent 0.5mL x 1 administered intramuscularly to left deltoid \par Pneumovax 23 - discussed, patient to receive the vaccine at next follow-up visit \par Shingrix - discussed, patient to determine if his insurance plan covers the vaccine and follow up for 1st dose\par

## 2019-11-08 NOTE — HISTORY OF PRESENT ILLNESS
[de-identified] : Patient is a 59 year old male with a past medical history as below who presents for general follow-up. Patient states he is taking all medications as prescribed and denies any adverse reactions or side effects. Lower extremity edema has improved on Furosemide and Spironolactone. Patient states he sees liver specialist, Dr. Dinh at [facility] and is currently being evaluated for a liver transplant. EGD per gastroenterologist, Dr. Leal revealed mild esophageal varices. Patient states he is currently on a low calorie/low sodium diet per his dietician. He notes weight loss since his last visit. Patient states chest X-Ray revealed atelectasis. He notes subsequent CT scan revealed significant coronary calcification, but no interstitial pulmonary fibrosis. Patient states recent echocardiogram revealed reversible hypokinesis at the apex. He notes having a nuclear pharmacological (adenosine) stress test done and states he will be following up with an interventional cardiologist at [facility]. Patient states last DEXA scan revealed osteoporosis of the spine and hip. He notes intermittent, sharp back pain when in a supine or seated position. Patient notes last blood work revealed low testosterone. He notes fatigue. Patient states he has been trying to exercise regularly (gym, 2-3x per week). Patient inquires about receiving the flu vaccine today. He inquires about receiving the Pneumonia vaccine and Shingrix. Patient states he will be having blood work done later today per his oncologist.  [FreeTextEntry1] : general follow-up

## 2019-11-08 NOTE — PHYSICAL EXAM
[No Acute Distress] : no acute distress [Well Nourished] : well nourished [Well Developed] : well developed [Well-Appearing] : well-appearing [Normal Sclera/Conjunctiva] : normal sclera/conjunctiva [PERRL] : pupils equal round and reactive to light [EOMI] : extraocular movements intact [Normal Outer Ear/Nose] : the outer ears and nose were normal in appearance [Normal Oropharynx] : the oropharynx was normal [No JVD] : no jugular venous distention [No Lymphadenopathy] : no lymphadenopathy [Supple] : supple [Thyroid Normal, No Nodules] : the thyroid was normal and there were no nodules present [No Respiratory Distress] : no respiratory distress  [No Accessory Muscle Use] : no accessory muscle use [Clear to Auscultation] : lungs were clear to auscultation bilaterally [Normal Rate] : normal rate  [Regular Rhythm] : with a regular rhythm [Normal S1, S2] : normal S1 and S2 [No Murmur] : no murmur heard [No Carotid Bruits] : no carotid bruits [No Abdominal Bruit] : a ~M bruit was not heard ~T in the abdomen [No Varicosities] : no varicosities [Pedal Pulses Present] : the pedal pulses are present [No Edema] : there was no peripheral edema [No Palpable Aorta] : no palpable aorta [No Extremity Clubbing/Cyanosis] : no extremity clubbing/cyanosis [Soft] : abdomen soft [Non Tender] : non-tender [Non-distended] : non-distended [No Masses] : no abdominal mass palpated [No HSM] : no HSM [Normal Bowel Sounds] : normal bowel sounds [Normal Posterior Cervical Nodes] : no posterior cervical lymphadenopathy [Normal Anterior Cervical Nodes] : no anterior cervical lymphadenopathy [No CVA Tenderness] : no CVA  tenderness [No Spinal Tenderness] : no spinal tenderness [No Joint Swelling] : no joint swelling [Grossly Normal Strength/Tone] : grossly normal strength/tone [No Rash] : no rash [Coordination Grossly Intact] : coordination grossly intact [No Focal Deficits] : no focal deficits [Normal Gait] : normal gait [Deep Tendon Reflexes (DTR)] : deep tendon reflexes were 2+ and symmetric [Normal Affect] : the affect was normal [Normal Insight/Judgement] : insight and judgment were intact [de-identified] : obese [de-identified] : 2+ edema of RLE; 1+ edema of LLE

## 2019-11-08 NOTE — REVIEW OF SYSTEMS
[Recent Change In Weight] : ~T recent weight change [Negative] : Heme/Lymph [Fatigue] : fatigue [Lower Ext Edema] : lower extremity edema [Back Pain] : back pain [FreeTextEntry2] : weight loss

## 2019-11-08 NOTE — ASSESSMENT
[FreeTextEntry1] : Patient is a 59 year old male with a past medical history as above who presents for general follow-up.

## 2019-12-11 ENCOUNTER — APPOINTMENT (OUTPATIENT)
Dept: PULMONOLOGY | Facility: CLINIC | Age: 59
End: 2019-12-11

## 2020-01-08 ENCOUNTER — APPOINTMENT (OUTPATIENT)
Dept: INTERNAL MEDICINE | Facility: CLINIC | Age: 60
End: 2020-01-08
Payer: COMMERCIAL

## 2020-01-08 ENCOUNTER — LABORATORY RESULT (OUTPATIENT)
Age: 60
End: 2020-01-08

## 2020-01-08 VITALS
SYSTOLIC BLOOD PRESSURE: 102 MMHG | RESPIRATION RATE: 16 BRPM | OXYGEN SATURATION: 97 % | BODY MASS INDEX: 32.64 KG/M2 | TEMPERATURE: 98.5 F | HEART RATE: 70 BPM | WEIGHT: 215.38 LBS | HEIGHT: 68 IN | DIASTOLIC BLOOD PRESSURE: 70 MMHG

## 2020-01-08 PROCEDURE — 99214 OFFICE O/P EST MOD 30 MIN: CPT | Mod: 25

## 2020-01-08 PROCEDURE — 90670 PCV13 VACCINE IM: CPT

## 2020-01-08 PROCEDURE — 36415 COLL VENOUS BLD VENIPUNCTURE: CPT

## 2020-01-08 PROCEDURE — G0009: CPT

## 2020-01-08 RX ORDER — ASPIRIN 81 MG
81 TABLET, DELAYED RELEASE (ENTERIC COATED) ORAL DAILY
Refills: 0 | Status: ACTIVE | COMMUNITY

## 2020-01-08 NOTE — HISTORY OF PRESENT ILLNESS
[de-identified] : Patient is a 59 year old male with a past medical history as below who presents for non-fasting blood work, Pneumonia vaccine (Prevnar 13), and general follow-up. Patient states he is taking all medications as prescribed and denies any adverse reactions or side effects. He was started on Bumetanide and Eplerenone for bilateral lower extremity edema. He inquires about using a pneumatic compression device. Patient notes recent bypass surgery (CABG) and states he has followed up with CT Surgeon,  Dr. Quan. He notes he will be starting cardiac rehabilitation at [place] next week. He is currently on Oxycodone for post-surgical pain. Patient notes pain when he takes a deep breath. Patient regularly follows up with liver specialist, Dr. Dinh given history of cryptogenic hepatic cirrhosis and is on liver transplant list. [FreeTextEntry1] : non-fasting blood work, Pneumonia vaccine, and general follow-up

## 2020-01-08 NOTE — ASSESSMENT
[FreeTextEntry1] : Patient is a 59 year old male with a past medical history as above who presents for non-fasting blood work, Pneumonia vaccine (Prevnar 13), and general follow-up.

## 2020-01-08 NOTE — PHYSICAL EXAM
[No Acute Distress] : no acute distress [Well Nourished] : well nourished [Well Developed] : well developed [Well-Appearing] : well-appearing [Normal Sclera/Conjunctiva] : normal sclera/conjunctiva [PERRL] : pupils equal round and reactive to light [EOMI] : extraocular movements intact [Normal Outer Ear/Nose] : the outer ears and nose were normal in appearance [Normal Oropharynx] : the oropharynx was normal [No JVD] : no jugular venous distention [No Lymphadenopathy] : no lymphadenopathy [Supple] : supple [Thyroid Normal, No Nodules] : the thyroid was normal and there were no nodules present [Regular Rhythm] : with a regular rhythm [Normal Rate] : normal rate  [Normal S1, S2] : normal S1 and S2 [No Murmur] : no murmur heard [No Abdominal Bruit] : a ~M bruit was not heard ~T in the abdomen [No Carotid Bruits] : no carotid bruits [No Varicosities] : no varicosities [Pedal Pulses Present] : the pedal pulses are present [No Palpable Aorta] : no palpable aorta [No Extremity Clubbing/Cyanosis] : no extremity clubbing/cyanosis [Soft] : abdomen soft [Non Tender] : non-tender [Non-distended] : non-distended [No Masses] : no abdominal mass palpated [No HSM] : no HSM [Normal Bowel Sounds] : normal bowel sounds [Normal Posterior Cervical Nodes] : no posterior cervical lymphadenopathy [Normal Anterior Cervical Nodes] : no anterior cervical lymphadenopathy [No CVA Tenderness] : no CVA  tenderness [No Spinal Tenderness] : no spinal tenderness [No Joint Swelling] : no joint swelling [Grossly Normal Strength/Tone] : grossly normal strength/tone [No Rash] : no rash [Coordination Grossly Intact] : coordination grossly intact [No Focal Deficits] : no focal deficits [Deep Tendon Reflexes (DTR)] : deep tendon reflexes were 2+ and symmetric [Normal Gait] : normal gait [Normal Affect] : the affect was normal [Normal Insight/Judgement] : insight and judgment were intact [de-identified] : decreased breath sounds at bases bilaterally  [de-identified] : 2+ edema in lower extremities bilaterally  [de-identified] : ecchymosis; thoracotomy scar clean, dry, and intact [de-identified] : obese

## 2020-01-08 NOTE — PLAN
[FreeTextEntry1] : Infectious Disease\par Prevnar 13 - 0.5mL x 1 administered intramuscularly to the left deltoid - Pneumovax 23 to be administered in 2-3 months  \par Cardiology\par arteriosclerosis of coronary artery - s/p bypass surgery - continue Aspirin 81mg p.o.q.d. - continue to follow up with cardiologist, Dr. Quan \par s/p bypass surgery - continue Oxycodone HCl 5mg BID p.o. p.r.n. for pain - patient to start cardiac rehab next week \par continue Nadolol 20mg p.o.q.d.\par Vascular\par bilateral lower extremity edema - likely secondary to venous insufficiency and contribution from cirrhosis - continue Bumetanide 1mg BID p.o.q.d. and Eplerenone 25mg BID p.o.q.d. - patient to start using pneumatic compressor - continue using zippered compression stockings - continue keeping legs elevated - continue low sodium diet and weight loss\par Endocrinology\par obesity - continue low carbohydrate diet\par Gastroenterology\par cirrhotic liver - continue Xifaxan 550mg BID p.o.q.d. as directed - continue to follow up liver specialist, Dr. Dinh \par Urology\par history of nephrolithiasis - continue staying well-hydrated - follow up with urologist, Dr. Hoenig as needed\par \par check CBC and CMP \par Patient to follow up for Pneumovax 23 administration.

## 2020-01-08 NOTE — ADDENDUM
[FreeTextEntry1] : I, Janes Shah, acted solely as scribe for Dr. Portillo Mckeon DO on this date 01/08/2020  8:10AM .\par \par All medical record entries made by the Scribe were at my, Dr. Portillo Mckeon DO direction and personally dictated by me on 01/08/2020  8:10AM. I have reviewed the chart and agree that the record accurately reflects my personal performance of the history, physical exam, assessment and plan. I have also personally directed, reviewed and agreed with the chart.\par

## 2020-01-08 NOTE — REVIEW OF SYSTEMS
[Negative] : Heme/Lymph [Lower Ext Edema] : lower extremity edema [FreeTextEntry5] : bilateral lower extremity edema [FreeTextEntry6] : pain when taking a breath

## 2020-01-12 ENCOUNTER — RX RENEWAL (OUTPATIENT)
Age: 60
End: 2020-01-12

## 2020-01-12 LAB
ALBUMIN SERPL ELPH-MCNC: 2.2 G/DL
ALP BLD-CCNC: 132 U/L
ALT SERPL-CCNC: 29 U/L
ANION GAP SERPL CALC-SCNC: 11 MMOL/L
AST SERPL-CCNC: 44 U/L
BASOPHILS # BLD AUTO: 0.05 K/UL
BASOPHILS NFR BLD AUTO: 1 %
BILIRUB SERPL-MCNC: 4 MG/DL
BUN SERPL-MCNC: 15 MG/DL
CALCIUM SERPL-MCNC: 8.5 MG/DL
CHLORIDE SERPL-SCNC: 99 MMOL/L
CO2 SERPL-SCNC: 29 MMOL/L
CREAT SERPL-MCNC: 0.83 MG/DL
EOSINOPHIL # BLD AUTO: 0.24 K/UL
EOSINOPHIL NFR BLD AUTO: 5 %
GLUCOSE SERPL-MCNC: 118 MG/DL
HCT VFR BLD CALC: 33.5 %
HGB BLD-MCNC: 10.4 G/DL
LYMPHOCYTES # BLD AUTO: 1.04 K/UL
LYMPHOCYTES NFR BLD AUTO: 22 %
MAN DIFF?: NORMAL
MCHC RBC-ENTMCNC: 31 GM/DL
MCHC RBC-ENTMCNC: 35.1 PG
MCV RBC AUTO: 113.2 FL
MONOCYTES # BLD AUTO: 0.61 K/UL
MONOCYTES NFR BLD AUTO: 13 %
NEUTROPHILS # BLD AUTO: 2.78 K/UL
NEUTROPHILS NFR BLD AUTO: 59 %
PLATELET # BLD AUTO: 97 K/UL
POTASSIUM SERPL-SCNC: 3.5 MMOL/L
PROT SERPL-MCNC: 6.6 G/DL
RBC # BLD: 2.96 M/UL
RBC # FLD: 18 %
SODIUM SERPL-SCNC: 139 MMOL/L
WBC # FLD AUTO: 4.71 K/UL

## 2020-02-04 ENCOUNTER — APPOINTMENT (OUTPATIENT)
Dept: CARDIOLOGY | Facility: CLINIC | Age: 60
End: 2020-02-04

## 2020-02-04 ENCOUNTER — APPOINTMENT (OUTPATIENT)
Dept: CARDIOLOGY | Facility: CLINIC | Age: 60
End: 2020-02-04
Payer: COMMERCIAL

## 2020-02-04 PROCEDURE — 93798 PHYS/QHP OP CAR RHAB W/ECG: CPT

## 2020-02-10 ENCOUNTER — APPOINTMENT (OUTPATIENT)
Dept: CARDIOLOGY | Facility: CLINIC | Age: 60
End: 2020-02-10

## 2020-02-12 ENCOUNTER — APPOINTMENT (OUTPATIENT)
Dept: CARDIOLOGY | Facility: CLINIC | Age: 60
End: 2020-02-12

## 2020-02-13 ENCOUNTER — APPOINTMENT (OUTPATIENT)
Dept: CARDIOLOGY | Facility: CLINIC | Age: 60
End: 2020-02-13

## 2020-02-19 ENCOUNTER — APPOINTMENT (OUTPATIENT)
Dept: CARDIOLOGY | Facility: CLINIC | Age: 60
End: 2020-02-19

## 2020-02-20 ENCOUNTER — APPOINTMENT (OUTPATIENT)
Dept: CARDIOLOGY | Facility: CLINIC | Age: 60
End: 2020-02-20

## 2020-02-21 ENCOUNTER — RX CHANGE (OUTPATIENT)
Age: 60
End: 2020-02-21

## 2020-02-21 ENCOUNTER — EMERGENCY (EMERGENCY)
Facility: HOSPITAL | Age: 60
LOS: 1 days | Discharge: HOME | End: 2020-02-21
Attending: EMERGENCY MEDICINE | Admitting: EMERGENCY MEDICINE
Payer: COMMERCIAL

## 2020-02-21 VITALS
RESPIRATION RATE: 19 BRPM | HEART RATE: 77 BPM | WEIGHT: 205.03 LBS | SYSTOLIC BLOOD PRESSURE: 128 MMHG | OXYGEN SATURATION: 98 % | TEMPERATURE: 98 F | HEIGHT: 68 IN | DIASTOLIC BLOOD PRESSURE: 84 MMHG

## 2020-02-21 VITALS
TEMPERATURE: 98 F | DIASTOLIC BLOOD PRESSURE: 75 MMHG | SYSTOLIC BLOOD PRESSURE: 111 MMHG | HEART RATE: 75 BPM | RESPIRATION RATE: 15 BRPM | OXYGEN SATURATION: 98 %

## 2020-02-21 DIAGNOSIS — Z95.1 PRESENCE OF AORTOCORONARY BYPASS GRAFT: Chronic | ICD-10-CM

## 2020-02-21 PROCEDURE — 99283 EMERGENCY DEPT VISIT LOW MDM: CPT

## 2020-02-21 PROCEDURE — 73110 X-RAY EXAM OF WRIST: CPT

## 2020-02-21 PROCEDURE — 99283 EMERGENCY DEPT VISIT LOW MDM: CPT | Mod: 25

## 2020-02-21 PROCEDURE — 73110 X-RAY EXAM OF WRIST: CPT | Mod: 26,LT

## 2020-02-21 NOTE — ED PROVIDER NOTE - PATIENT PORTAL LINK FT
You can access the FollowMyHealth Patient Portal offered by [facility] by registering at the following website: [email or web address]. By joining [employer]’s FollowMyHealth portal, you will also be able to view your health information using other applications (apps) compatible with our system.

## 2020-02-21 NOTE — ED PROVIDER NOTE - PROGRESS NOTE DETAILS
Dusty CONNER for ED attending, Dr. Fernandez : 59 y/o male presents s/p mechanical fall today. Pt states he tripped, fell forward and landed on his left hand. Now c/o left wrist pain. No head injury, LOC, neck or back pain. No difficulty ambulating. No CP, SOB, dizziness. On ASA 81mg daily.   PE: awake, alert, NAD, no signs of head trauma, PERRL, heart s1s2, rrr, lungs cta b/l, abd soft, nontender, back and neck nontender FROM, +mild tenderness dorsum left wrist between radius and ulna, FROM, proximal arm, elbow and shoulder nontender FROM.

## 2020-02-21 NOTE — ED ADULT NURSE NOTE - OBJECTIVE STATEMENT
Pt came ambulatory accompanied by his daughter for pain left elbow and left wrist secondary to  a fall incident . Pt stated that he missed the last step a  stair while walking down and fell . Pt denies and head injury No headache No neck pain

## 2020-02-21 NOTE — ED PROVIDER NOTE - NSFOLLOWUPINSTRUCTIONS_ED_ALL_ED_FT
RICE therapy as discussed  Follow up with orthopedics within 1 week for further evaluation  Return to ED immediately for new or worsening symptoms

## 2020-02-21 NOTE — ED ADULT NURSE NOTE - NSIMPLEMENTINTERV_GEN_ALL_ED
Implemented All Fall with Harm Risk Interventions:  [place] to call system. Call bell, personal items and telephone within reach. Instruct patient to call for assistance. Room bathroom lighting operational. Non-slip footwear when patient is off stretcher. Physically safe environment: no spills, clutter or unnecessary equipment. Stretcher in lowest position, wheels locked, appropriate side rails in place. Provide visual cue, wrist band, yellow gown, etc. Monitor gait and stability. Monitor for mental status changes and reorient to person, place, and time. Review medications for side effects contributing to fall risk. Reinforce activity limits and safety measures with patient and family. Provide visual clues: red socks.

## 2020-02-21 NOTE — ED PROVIDER NOTE - OBJECTIVE STATEMENT
61 yo M PMHx liver cirrhosis, thrombocytopenia, s/p CABG ~10 weeks ago presents to ED c/o left wrist pain s/p mechanical fall just prior to arrival. Pt states that he slipped walking up the steps outside of his home. Fell on outstretched left hand. Denies head trauma, chest pain, SOB, dizziness.

## 2020-02-21 NOTE — ED PROVIDER NOTE - CARE PROVIDER_API CALL
Paolo Patel)  Orthopaedic Surgery  [address] [address]  Phone: [phone]  Fax: [phone]  Follow Up Time:

## 2020-02-21 NOTE — ED PROVIDER NOTE - ATTENDING CONTRIBUTION TO CARE
61 y/o male presents s/p mechanical fall today. Pt states he tripped, fell forward and landed on his left outstretched hand. Now c/o left wrist pain. No head injury, LOC, neck or back pain. No difficulty ambulating. No CP, SOB, dizziness. On ASA 81mg daily.  no numb/ting/focal weak. no loc. No truncal trauma.  PE: awake, alert, NAD, no signs of head trauma, PERRL, heart s1s2, rrr, lungs cta b/l, abd soft, nontender, back and neck nontender FROM, +mild tenderness dorsum left wrist between radius and ulna, FROM, proximal arm, elbow and shoulder nontender FROM. nl dist str/sens equal bl, 2+ pulses. nl cap refill  Mech fall w/o head trauma - no anticoag use. L wrist inj. Check xr, splint, outpt fu

## 2020-02-24 ENCOUNTER — APPOINTMENT (OUTPATIENT)
Dept: CARDIOLOGY | Facility: CLINIC | Age: 60
End: 2020-02-24

## 2020-02-26 ENCOUNTER — APPOINTMENT (OUTPATIENT)
Dept: CARDIOLOGY | Facility: CLINIC | Age: 60
End: 2020-02-26

## 2020-02-26 DIAGNOSIS — M25.532 PAIN IN LEFT WRIST: ICD-10-CM

## 2020-02-27 ENCOUNTER — APPOINTMENT (OUTPATIENT)
Dept: CARDIOLOGY | Facility: CLINIC | Age: 60
End: 2020-02-27

## 2020-02-28 ENCOUNTER — TRANSCRIPTION ENCOUNTER (OUTPATIENT)
Age: 60
End: 2020-02-28

## 2020-02-28 ENCOUNTER — APPOINTMENT (OUTPATIENT)
Dept: ORTHOPEDIC SURGERY | Facility: CLINIC | Age: 60
End: 2020-02-28
Payer: COMMERCIAL

## 2020-02-28 VITALS
DIASTOLIC BLOOD PRESSURE: 65 MMHG | SYSTOLIC BLOOD PRESSURE: 91 MMHG | HEART RATE: 81 BPM | HEIGHT: 68 IN | BODY MASS INDEX: 32.58 KG/M2 | WEIGHT: 215 LBS

## 2020-02-28 PROCEDURE — 73110 X-RAY EXAM OF WRIST: CPT | Mod: 26,LT

## 2020-02-28 PROCEDURE — 99203 OFFICE O/P NEW LOW 30 MIN: CPT

## 2020-02-28 NOTE — HISTORY OF PRESENT ILLNESS
[Right] : right hand dominant [FreeTextEntry1] : He comes in today for evaluation of a left hand injury that occurred when he fell off the top step of his porch and landing on his hand. He states that he was seen at [facility], where radiographs demonstrated negative findings.  He had repeat x-rays today that demonstrated a possible fracture.\par \par He is currently in a splint. He states that his pain is 10/10 and is intermittent. He also notes that his pain today is 4/10. He has utilized ice/heat and Biofreeze as needed with relief. He states that he has been taking aspirin. \par \par He is a physician.\par \par

## 2020-02-28 NOTE — DISCUSSION/SUMMARY
[FreeTextEntry1] : He has findings consistent with a nondisplaced and stable left distal radius fracture involving the dorsal cortex.\par \par I had a discussion regarding today's visit, the diagnosis, and treatment recommendations / options. At this time, I recommended full-time splinting.  This is a stable fracture.  He will follow up with me in 1 week for repeat x-rays. \par \par The patient has agreed to this plan of management and has expressed full understanding.  All questions were fully answered to the patient's satisfaction.\par \par Over 50% of the time spent with the patient was on counseling the patient on the above diagnosis, treatment plan and prognosis.

## 2020-02-28 NOTE — ADDENDUM
[FreeTextEntry1] : I, Gunner Mascorro, acted solely as a scribe for Dr. Marlon Mcclain on 02/28/2020 . \par \par All medical record entries made by the Scribe were at my, Dr. Marlon Mcclain, direction and personally dictated by me on 02/28/2020. I have personally reviewed the chart and agree that the record accurately reflects my personal performance of the history, physical exam, assessment and plan.

## 2020-02-28 NOTE — PHYSICAL EXAM
[de-identified] : - Constitutional: This is a male in no obvious distress.  \par - Psych: Patient is alert and oriented to person, place and time.  Patient has a normal mood and affect.\par - Cardiovascular: Normal pulses throughout the upper extremities.  No significant varicosities are noted in the upper extremities. \par - Neuro: Strength and sensation are intact throughout the upper extremities.  Patient has normal coordination. \par \par ---\par \par  Examination of the left wrist after the splint was removed demonstrates diffuse swelling and ecchymosis.  He is tender along the distal radius dorsally.  He has appropriate motion of the digits.  He is neurovascularly intact distally. [de-identified] : I reviewed PA, lateral and oblique radiographs of his left wrist from today.  These demonstrate a nondisplaced distal radius fracture involving the dorsal cortex.

## 2020-03-02 ENCOUNTER — LABORATORY RESULT (OUTPATIENT)
Age: 60
End: 2020-03-02

## 2020-03-02 ENCOUNTER — APPOINTMENT (OUTPATIENT)
Dept: CARDIOLOGY | Facility: CLINIC | Age: 60
End: 2020-03-02

## 2020-03-02 DIAGNOSIS — J18.9 PNEUMONIA, UNSPECIFIED ORGANISM: ICD-10-CM

## 2020-03-03 ENCOUNTER — RX CHANGE (OUTPATIENT)
Age: 60
End: 2020-03-03

## 2020-03-04 ENCOUNTER — APPOINTMENT (OUTPATIENT)
Dept: INTERNAL MEDICINE | Facility: CLINIC | Age: 60
End: 2020-03-04

## 2020-03-05 ENCOUNTER — APPOINTMENT (OUTPATIENT)
Dept: CARDIOLOGY | Facility: CLINIC | Age: 60
End: 2020-03-05

## 2020-03-06 PROBLEM — S52.502A DISTAL RADIUS FRACTURE, LEFT: Status: ACTIVE | Noted: 2020-02-28

## 2020-03-09 ENCOUNTER — APPOINTMENT (OUTPATIENT)
Dept: CARDIOLOGY | Facility: CLINIC | Age: 60
End: 2020-03-09

## 2020-03-11 ENCOUNTER — APPOINTMENT (OUTPATIENT)
Dept: INTERNAL MEDICINE | Facility: CLINIC | Age: 60
End: 2020-03-11

## 2020-03-11 ENCOUNTER — APPOINTMENT (OUTPATIENT)
Dept: CARDIOLOGY | Facility: CLINIC | Age: 60
End: 2020-03-11

## 2020-03-11 ENCOUNTER — APPOINTMENT (OUTPATIENT)
Dept: ORTHOPEDIC SURGERY | Facility: CLINIC | Age: 60
End: 2020-03-11
Payer: COMMERCIAL

## 2020-03-11 DIAGNOSIS — S52.502A UNSPECIFIED FRACTURE OF THE LOWER END OF LEFT RADIUS, INITIAL ENCOUNTER FOR CLOSED FRACTURE: ICD-10-CM

## 2020-03-11 PROCEDURE — 73110 X-RAY EXAM OF WRIST: CPT | Mod: LT

## 2020-03-11 PROCEDURE — 99213 OFFICE O/P EST LOW 20 MIN: CPT

## 2020-03-11 NOTE — DISCUSSION/SUMMARY
[FreeTextEntry1] : I had a discussion regarding today's visit, the diagnosis and treatment recommendations / options.  At this time, I recommended continued full-time splinting.  He will follow-up in 2 weeks.\par \par The patient has agreed to the above plan of management and has expressed full understanding.  All questions were fully answered to the patient's satisfaction.\par \par I spent at least 25 minutes of face-to-face time with the patient.  Over 50% of this time was spent on counseling the patient on the above diagnosis, treatment plan and prognosis.

## 2020-03-11 NOTE — HISTORY OF PRESENT ILLNESS
[FreeTextEntry1] : 13 days status post left distal radius fracture.\par \par He is still having some pain but is improving.\par \par He is a physician.\par \par

## 2020-03-11 NOTE — PHYSICAL EXAM
[de-identified] : - Constitutional: This is a male in no obvious distress.  \par - Psych: Patient is alert and oriented to person, place and time.  Patient has a normal mood and affect.\par - Cardiovascular: Normal pulses throughout the upper extremities.  No significant varicosities are noted in the upper extremities. \par - Neuro: Strength and sensation are intact throughout the upper extremities.  Patient has normal coordination. \par \par ---\par \par  Examination of his left wrist after the splint was removed demonstrates decreased swelling.  He remains tender along the distal radius dorsally.  He has full motion of the digits.  He remains neurovascularly intact distally. [de-identified] : PA, lateral and oblique radiographs of his left wrist demonstrate his distal radius fracture to be nondisplaced, involving the dorsal cortex.

## 2020-03-12 ENCOUNTER — APPOINTMENT (OUTPATIENT)
Dept: CARDIOLOGY | Facility: CLINIC | Age: 60
End: 2020-03-12

## 2020-03-16 ENCOUNTER — APPOINTMENT (OUTPATIENT)
Dept: CARDIOLOGY | Facility: CLINIC | Age: 60
End: 2020-03-16

## 2020-03-18 ENCOUNTER — APPOINTMENT (OUTPATIENT)
Dept: CARDIOLOGY | Facility: CLINIC | Age: 60
End: 2020-03-18

## 2020-03-19 ENCOUNTER — APPOINTMENT (OUTPATIENT)
Dept: CARDIOLOGY | Facility: CLINIC | Age: 60
End: 2020-03-19

## 2020-03-23 ENCOUNTER — APPOINTMENT (OUTPATIENT)
Dept: CARDIOLOGY | Facility: CLINIC | Age: 60
End: 2020-03-23

## 2020-03-25 ENCOUNTER — APPOINTMENT (OUTPATIENT)
Dept: ORTHOPEDIC SURGERY | Facility: CLINIC | Age: 60
End: 2020-03-25

## 2020-03-25 ENCOUNTER — APPOINTMENT (OUTPATIENT)
Dept: CARDIOLOGY | Facility: CLINIC | Age: 60
End: 2020-03-25

## 2020-03-26 ENCOUNTER — APPOINTMENT (OUTPATIENT)
Dept: CARDIOLOGY | Facility: CLINIC | Age: 60
End: 2020-03-26

## 2020-03-30 ENCOUNTER — APPOINTMENT (OUTPATIENT)
Dept: CARDIOLOGY | Facility: CLINIC | Age: 60
End: 2020-03-30

## 2020-04-01 ENCOUNTER — APPOINTMENT (OUTPATIENT)
Dept: CARDIOLOGY | Facility: CLINIC | Age: 60
End: 2020-04-01

## 2020-04-02 ENCOUNTER — APPOINTMENT (OUTPATIENT)
Dept: CARDIOLOGY | Facility: CLINIC | Age: 60
End: 2020-04-02

## 2020-04-06 ENCOUNTER — APPOINTMENT (OUTPATIENT)
Dept: CARDIOLOGY | Facility: CLINIC | Age: 60
End: 2020-04-06

## 2020-04-08 ENCOUNTER — APPOINTMENT (OUTPATIENT)
Dept: CARDIOLOGY | Facility: CLINIC | Age: 60
End: 2020-04-08

## 2020-04-09 ENCOUNTER — APPOINTMENT (OUTPATIENT)
Dept: CARDIOLOGY | Facility: CLINIC | Age: 60
End: 2020-04-09

## 2020-04-13 ENCOUNTER — APPOINTMENT (OUTPATIENT)
Dept: CARDIOLOGY | Facility: CLINIC | Age: 60
End: 2020-04-13

## 2020-04-15 ENCOUNTER — APPOINTMENT (OUTPATIENT)
Dept: CARDIOLOGY | Facility: CLINIC | Age: 60
End: 2020-04-15

## 2020-04-16 ENCOUNTER — APPOINTMENT (OUTPATIENT)
Dept: CARDIOLOGY | Facility: CLINIC | Age: 60
End: 2020-04-16

## 2020-04-20 ENCOUNTER — APPOINTMENT (OUTPATIENT)
Dept: CARDIOLOGY | Facility: CLINIC | Age: 60
End: 2020-04-20

## 2020-04-22 ENCOUNTER — APPOINTMENT (OUTPATIENT)
Dept: CARDIOLOGY | Facility: CLINIC | Age: 60
End: 2020-04-22

## 2020-04-23 ENCOUNTER — APPOINTMENT (OUTPATIENT)
Dept: CARDIOLOGY | Facility: CLINIC | Age: 60
End: 2020-04-23

## 2020-04-27 ENCOUNTER — APPOINTMENT (OUTPATIENT)
Dept: CARDIOLOGY | Facility: CLINIC | Age: 60
End: 2020-04-27

## 2020-04-29 ENCOUNTER — APPOINTMENT (OUTPATIENT)
Dept: CARDIOLOGY | Facility: CLINIC | Age: 60
End: 2020-04-29

## 2020-04-30 ENCOUNTER — APPOINTMENT (OUTPATIENT)
Dept: CARDIOLOGY | Facility: CLINIC | Age: 60
End: 2020-04-30

## 2020-05-04 ENCOUNTER — APPOINTMENT (OUTPATIENT)
Dept: CARDIOLOGY | Facility: CLINIC | Age: 60
End: 2020-05-04

## 2020-05-06 ENCOUNTER — APPOINTMENT (OUTPATIENT)
Dept: CARDIOLOGY | Facility: CLINIC | Age: 60
End: 2020-05-06

## 2020-05-19 ENCOUNTER — APPOINTMENT (OUTPATIENT)
Dept: INTERNAL MEDICINE | Facility: CLINIC | Age: 60
End: 2020-05-19
Payer: COMMERCIAL

## 2020-05-19 DIAGNOSIS — E87.6 HYPOKALEMIA: ICD-10-CM

## 2020-05-19 PROCEDURE — 99213 OFFICE O/P EST LOW 20 MIN: CPT | Mod: 95

## 2020-05-21 PROBLEM — E87.6 HYPOKALEMIA: Status: ACTIVE | Noted: 2020-01-28

## 2020-05-21 NOTE — HISTORY OF PRESENT ILLNESS
[FreeTextEntry1] : telehealth\par forms for work, general follow up [de-identified] : 60 year old male PMH as below presents through telehealth for general follow up, RX refill and to have forms filled out for work.  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  He received an influenza vaccine in November 2019 as documented in the immunization part of allscripts.  He had blood work demonstrating negative quantiferon gold blood test.  He is taking all medications as prescribed.  He is following up regularly with his liver transplant physician, Dr. Dinh.  Additionally, he follows with his cardiologist, Dr. Gutierrez.  He denies fever/chills, abdominal pain or bleeding. He is eating a healthy, protein rich diet and exercising by walking regularly.

## 2020-05-21 NOTE — ASSESSMENT
[FreeTextEntry1] : 60 year old male PMH as above presents for general follow up and for forms to be filled out for work.

## 2020-05-21 NOTE — PLAN
[FreeTextEntry1] : Cardiology\par CAD s/p CABG-continue with ASA -, follow up with Dr. Gutierrez\par \par Gastroenterolofy/hepatology\par idiopathic liver failure-follow up with Dr. Dinh of liver transplant, continue xifaxin, nadolol, lactulose prn, bumetanide and eplerenone\par \par FEN\par continue potassium, check potassium regularly\par \par forms filled out and signed (see allscripts)

## 2020-12-08 ENCOUNTER — NON-APPOINTMENT (OUTPATIENT)
Age: 60
End: 2020-12-08

## 2020-12-08 ENCOUNTER — RESULT REVIEW (OUTPATIENT)
Age: 60
End: 2020-12-08

## 2020-12-08 ENCOUNTER — APPOINTMENT (OUTPATIENT)
Dept: RADIOLOGY | Facility: CLINIC | Age: 60
End: 2020-12-08
Payer: COMMERCIAL

## 2020-12-08 ENCOUNTER — OUTPATIENT (OUTPATIENT)
Dept: OUTPATIENT SERVICES | Facility: HOSPITAL | Age: 60
LOS: 1 days | End: 2020-12-08
Payer: COMMERCIAL

## 2020-12-08 DIAGNOSIS — M54.16 RADICULOPATHY, LUMBAR REGION: ICD-10-CM

## 2020-12-08 DIAGNOSIS — Z95.1 PRESENCE OF AORTOCORONARY BYPASS GRAFT: Chronic | ICD-10-CM

## 2020-12-08 DIAGNOSIS — Z00.8 ENCOUNTER FOR OTHER GENERAL EXAMINATION: ICD-10-CM

## 2020-12-08 PROCEDURE — 72020 X-RAY EXAM OF SPINE 1 VIEW: CPT

## 2020-12-08 PROCEDURE — 72020 X-RAY EXAM OF SPINE 1 VIEW: CPT | Mod: 26

## 2020-12-14 ENCOUNTER — RESULT REVIEW (OUTPATIENT)
Age: 60
End: 2020-12-14

## 2020-12-14 ENCOUNTER — APPOINTMENT (OUTPATIENT)
Dept: MRI IMAGING | Facility: CLINIC | Age: 60
End: 2020-12-14
Payer: COMMERCIAL

## 2020-12-14 ENCOUNTER — OUTPATIENT (OUTPATIENT)
Dept: OUTPATIENT SERVICES | Facility: HOSPITAL | Age: 60
LOS: 1 days | End: 2020-12-14
Payer: COMMERCIAL

## 2020-12-14 DIAGNOSIS — Z95.1 PRESENCE OF AORTOCORONARY BYPASS GRAFT: Chronic | ICD-10-CM

## 2020-12-14 DIAGNOSIS — M54.16 RADICULOPATHY, LUMBAR REGION: ICD-10-CM

## 2020-12-14 PROCEDURE — 72148 MRI LUMBAR SPINE W/O DYE: CPT | Mod: 26

## 2020-12-14 PROCEDURE — 72148 MRI LUMBAR SPINE W/O DYE: CPT

## 2020-12-15 DIAGNOSIS — S32.000A WEDGE COMPRESSION FRACTURE OF UNSPECIFIED LUMBAR VERTEBRA, INITIAL ENCOUNTER FOR CLOSED FRACTURE: ICD-10-CM

## 2020-12-16 ENCOUNTER — APPOINTMENT (OUTPATIENT)
Dept: ORTHOPEDIC SURGERY | Facility: CLINIC | Age: 60
End: 2020-12-16
Payer: COMMERCIAL

## 2020-12-16 VITALS — HEART RATE: 65 BPM | SYSTOLIC BLOOD PRESSURE: 120 MMHG | TEMPERATURE: 97 F | DIASTOLIC BLOOD PRESSURE: 81 MMHG

## 2020-12-16 VITALS — BODY MASS INDEX: 26.98 KG/M2 | TEMPERATURE: 97 F | WEIGHT: 178 LBS | HEIGHT: 68 IN

## 2020-12-16 DIAGNOSIS — M84.48XA PATHOLOGICAL FRACTURE, OTHER SITE, INITIAL ENCOUNTER FOR FRACTURE: ICD-10-CM

## 2020-12-16 PROCEDURE — 99214 OFFICE O/P EST MOD 30 MIN: CPT

## 2020-12-16 PROCEDURE — 99072 ADDL SUPL MATRL&STAF TM PHE: CPT

## 2020-12-16 RX ORDER — RIFAXIMIN 550 MG/1
550 TABLET ORAL
Refills: 0 | Status: COMPLETED | COMMUNITY
End: 2020-12-16

## 2020-12-16 RX ORDER — COLCHICINE 0.6 MG/1
0.6 TABLET ORAL DAILY
Qty: 90 | Refills: 1 | Status: COMPLETED | COMMUNITY
End: 2020-12-16

## 2020-12-16 RX ORDER — EPLERENONE 25 MG/1
25 TABLET, COATED ORAL TWICE DAILY
Refills: 0 | Status: COMPLETED | COMMUNITY
End: 2020-12-16

## 2020-12-16 RX ORDER — MULTIVITAMIN
TABLET ORAL
Refills: 0 | Status: ACTIVE | COMMUNITY

## 2020-12-16 RX ORDER — CYANOCOBALAMIN (VITAMIN B-12) 500 MCG
0.8 TABLET ORAL DAILY
Qty: 30 | Refills: 0 | Status: COMPLETED | COMMUNITY
Start: 2019-02-18 | End: 2020-12-16

## 2020-12-16 RX ORDER — PANTOPRAZOLE 40 MG/1
40 TABLET, DELAYED RELEASE ORAL
Refills: 0 | Status: ACTIVE | COMMUNITY

## 2020-12-16 RX ORDER — POTASSIUM CHLORIDE 1500 MG/1
20 TABLET, FILM COATED, EXTENDED RELEASE ORAL
Qty: 90 | Refills: 0 | Status: COMPLETED | COMMUNITY
Start: 2020-01-28 | End: 2020-12-16

## 2020-12-16 RX ORDER — POTASSIUM CHLORIDE 750 MG/1
10 CAPSULE, EXTENDED RELEASE ORAL
Qty: 360 | Refills: 0 | Status: COMPLETED | COMMUNITY
Start: 2020-01-12 | End: 2020-12-16

## 2020-12-16 RX ORDER — NADOLOL 20 MG/1
20 TABLET ORAL DAILY
Refills: 0 | Status: COMPLETED | COMMUNITY
End: 2020-12-16

## 2020-12-17 ENCOUNTER — TRANSCRIPTION ENCOUNTER (OUTPATIENT)
Age: 60
End: 2020-12-17

## 2020-12-30 ENCOUNTER — APPOINTMENT (OUTPATIENT)
Dept: ORTHOPEDIC SURGERY | Facility: CLINIC | Age: 60
End: 2020-12-30
Payer: COMMERCIAL

## 2020-12-30 VITALS
TEMPERATURE: 97.2 F | BODY MASS INDEX: 26.07 KG/M2 | WEIGHT: 172 LBS | SYSTOLIC BLOOD PRESSURE: 127 MMHG | HEART RATE: 68 BPM | DIASTOLIC BLOOD PRESSURE: 81 MMHG | HEIGHT: 68 IN

## 2020-12-30 DIAGNOSIS — M84.48XA PATHOLOGICAL FRACTURE, OTHER SITE, INITIAL ENCOUNTER FOR FRACTURE: ICD-10-CM

## 2020-12-30 PROCEDURE — 99214 OFFICE O/P EST MOD 30 MIN: CPT

## 2020-12-30 PROCEDURE — 72100 X-RAY EXAM L-S SPINE 2/3 VWS: CPT

## 2020-12-30 PROCEDURE — 99072 ADDL SUPL MATRL&STAF TM PHE: CPT

## 2020-12-30 NOTE — DISCUSSION/SUMMARY
[Medication Risks Reviewed] : Medication risks reviewed [de-identified] : Given the new fracture seen at the T12 level I recommended use of a TLSO instead of the LSO he comes in with a new prescription was provided.  He is scheduled to see Dr. Alondra esquivel for osteoporosis evaluation management on January 11 and I recommended to continue to keep that appointment.  He will follow-up with a gastroenterologist for possible biliary stenosis.  From my perspective recommended routine follow-up in 2 to 4 weeks to get repeat x-rays of the thoracolumbar spine to ensure that there is no further progression of the T12 fracture.  If there is increase in deformity cement augmentation may be considered.  He will discuss the possibility of any surgical intervention with his gastroenterology team.\par \par The patient was educated regarding their condition, treatment options as well as prescribed course of treatment. \par Risks and benefits as well as alternatives to the proposed treatment were also provided to the patient \par They were given the opportunity to have all their questions answered to their satisfaction.\par \par Vital signs were reviewed with the patient and the patient was instructed to followup with their primary care provider for further management.\par \par Healthy lifestyle recommendations were also made including a tobacco free lifestyle, proper diet, and weight control.\par \par I spent 25 minutes of face-to-face time with the patient of which over 50% was spent in counseling the discussion above

## 2020-12-30 NOTE — HISTORY OF PRESENT ILLNESS
[6] : a current pain level of 6/10 [Daily] : ~He/She~ states the symptoms seem to be occuring daily [Prolonged Sitting] : worsened by prolonged sitting [Prolonged Standing] : worsened by prolonged standing [Improving] : improving [de-identified] : Patient is here today for repeat back xray. Patient states since getting TLSO brace a little less pain. Patient taking tramadol for pain and is getting constipated. \par Since last visit 12/16/20 had severe pain for a week, needed significant help getting in and out of bed.\par Last week and specially last day significant improvement in mobility. Takes tramadol 50 mg bid, flexeril 5 mg qhs, oxycodone 5 mg at night if he wakes up in middle of night.\par There is question of biliary obstruction and stenosis and will follow up with the gastroenterologist for possible MRCP. [de-identified] : twisting [de-identified] : TLSO brace and walker.

## 2020-12-30 NOTE — REASON FOR VISIT
[Follow-Up Visit] : a follow-up visit for [Spouse] : spouse [FreeTextEntry2] : L3 compression fracture

## 2020-12-30 NOTE — PHYSICAL EXAM
[Stooped] : stooped [Walker] : ambulates with walker [LE] : Sensory: Intact in bilateral lower extremities [0] : left ankle jerk 0 [DP] : dorsalis pedis 2+ and symmetric bilaterally [SLR] : negative straight leg raise [Plantar Reflex Right Only] : absent on the right [Plantar Reflex Left Only] : absent on the left [DTR Reflexes Clonus Of Right Ankle (___ Beats)] : absent on the right [DTR Reflexes Clonus Of Left Ankle (___ Beats)] : absent on the left [de-identified] : seen with his wife\par The pt is awake, alert and oriented to self, place and time, is uncomfortable and in no acute distress. Inspection of neck, back and lower extremities bilaterally reveals no rashes.   There is no focal tenderness over the cervical, thoracic spine, or the paraspinal or upper and lower extremities musculature. There is no sacroiliac tenderness. No greater trochanteric tenderness bilaterally. No atrophy or abnormal movements noted in the upper or lower extremities. There is no swelling noted in the upper or lower extremities bilaterally. No cervical lymphadenopathy noted anteriorly. No joint laxity noted in the upper and lower extremity joints bilaterally.\par Hip range of motion is degrees internal rotation 30° external rotation without pain. Full range of motion of the shoulders bilaterally with no significant pain\par There is no groin pain with hip internal rotation and a negative JEWELS test bilaterally.  [de-identified] : Healed chevron incision over the abdomen\par Seen with an LSO [de-identified] : AP and lateral image of the lumbar spine obtained today were compared with MRI obtained on December 14, 2020.  Previously noted L3 compression fracture is again seen with stable appearance of the fracture.  There is approximately 40% loss of vertebral body height anteriorly.  No segmental kyphosis noted at this level.  A new fracture is seen at the T12 level with approximately 30% loss of vertebral body height anteriorly and superior endplate depression.  Segmental kyphosis measures approximately 31 degrees from T11-L1.  Diffuse osteopenia noted.\par \par ________\par \par EXAM: MR SPINE LUMBAR\par \par PROCEDURE DATE: 12/14/2020\par \par INTERPRETATION: MR LUMBAR SPINE\par \par HISTORY: Lumbar spine pain with right foot number weakness. 2 weeks duration. Steroids and oxycodone use. Liver transplant in November, 2016.\par \par TECHNIQUE: Multiplanar MRI of the lumbar spine was performed without contrast using 5 sequences.\par \par COMPARISON: CT of the abdomen and pelvis dated June 8, 2019 and entire spine AP x-rays dated December 8, 2020.\par \par FINDINGS:\par \par OSSEOUS STRUCTURES\par  Fractures: Mild to moderate L3 compression deformity is new from the prior CT with intense marrow edema. Prevertebral soft tissue edema is also present. There is no significant retropulsion.\par  Alignment: Maintained.\par  Marrow Signal: Otherwise preserved.\par \par SPINAL CORD\par  Signal: Normal.\par  Conus Medullaris: Terminates at L1.\par \par DISC LEVELS\par T12-L1: Maintained.\par \par L1-L2: Maintained.\par \par L2-L3: Disc height is relatively maintained with mild disc bulging but no significant central canal stenosis. Mild bilateral neural foraminal stenosis is present.\par \par L3-L4: Maintained.\par \par L4-L5: Mild loss of disc height is present with disc bulging and ligamentum flavum hypertrophy contributing to mild central canal stenosis. Mild right and moderate left neural foraminal stenosis is present. There is mild right and mild-to-moderate left facet arthrosis.\par \par L5-S1: Disc height is maintained with some loss of disc signal and slight shallow broad-based central disc protrusion but no significant stenosis.\par \par VISUALIZED SACROILIAC JOINTS\par Maintained.\par \par SOFT TISSUES\par Unremarkable.\par \par IMPRESSION:\par 1. Mild to moderate L3 compression deformity with intense marrow edema and prevertebral soft tissue edema suggesting a recent injury.\par 2. There is no significant retropulsion.\par 3. Mild degenerative disc disease is present with mild central canal stenosis at L4-L5.\par 4. Variable neural foraminal stenosis is present.\par \par AARTI HAYWARD MD; Attending Radiologist\par This document has been electronically signed. Dec 15 2020 9:49AM

## 2021-01-11 ENCOUNTER — APPOINTMENT (OUTPATIENT)
Dept: ENDOCRINOLOGY | Facility: CLINIC | Age: 61
End: 2021-01-11
Payer: COMMERCIAL

## 2021-01-11 VITALS
TEMPERATURE: 98 F | OXYGEN SATURATION: 98 % | HEART RATE: 60 BPM | WEIGHT: 172 LBS | SYSTOLIC BLOOD PRESSURE: 134 MMHG | DIASTOLIC BLOOD PRESSURE: 80 MMHG | BODY MASS INDEX: 26.15 KG/M2

## 2021-01-11 VITALS — BODY MASS INDEX: 29.52 KG/M2 | HEIGHT: 64 IN

## 2021-01-11 PROCEDURE — 99072 ADDL SUPL MATRL&STAF TM PHE: CPT

## 2021-01-11 PROCEDURE — ZZZZZ: CPT

## 2021-01-11 PROCEDURE — 99245 OFF/OP CONSLTJ NEW/EST HI 55: CPT | Mod: 25

## 2021-01-11 PROCEDURE — 77080 DXA BONE DENSITY AXIAL: CPT

## 2021-01-13 ENCOUNTER — APPOINTMENT (OUTPATIENT)
Dept: ORTHOPEDIC SURGERY | Facility: CLINIC | Age: 61
End: 2021-01-13
Payer: COMMERCIAL

## 2021-01-13 VITALS
OXYGEN SATURATION: 98 % | HEART RATE: 76 BPM | TEMPERATURE: 97.5 F | BODY MASS INDEX: 29.37 KG/M2 | RESPIRATION RATE: 14 BRPM | HEIGHT: 64 IN | WEIGHT: 172 LBS | DIASTOLIC BLOOD PRESSURE: 79 MMHG | SYSTOLIC BLOOD PRESSURE: 126 MMHG

## 2021-01-13 PROCEDURE — 99072 ADDL SUPL MATRL&STAF TM PHE: CPT

## 2021-01-13 PROCEDURE — 72100 X-RAY EXAM L-S SPINE 2/3 VWS: CPT

## 2021-01-13 PROCEDURE — 99214 OFFICE O/P EST MOD 30 MIN: CPT | Mod: 57

## 2021-01-13 NOTE — ASSESSMENT
[FreeTextEntry1] : Complicated 60-year-old male presents with progressive osteoporosis and recent fractures.  Bone mineral density 2019 was moderately low but patient did not begin medical therapy for osteoporosis.  The patient does have a previous history of apparently calcium oxalate kidney stones.\par \par   Medical history is notable for recent liver transplantation for cryptogenic cirrhosis.  Patient is on steroids as part of transplant treatment.  He has new L3 and severe T12 compression fractures with fairly severe pain.  He may require kyphoplasty for pain relief.\par Current bone density shows progressive worsening osteoporosis spine and hip versus outside study 2019 as well as severely low proximal radius.\par The patient is at extremely high risk for future fractures.  Options of medical therapy were discussed in great detail with patient and spouse.  I would prefer to begin anabolic therapy with Tymlos if approved by liver transplant team.  I left a message with the nurse practitioner for Dr. Gio Dinh telephone #[phone] concerning this but have not received a response yet.  Tymlos would be advantageous for healing of fracture.  The other major alternative would be Prolia which has been well studied in transplant patients and is well-tolerated. Evenity is relatively contraindicated with patient's cardiac history.\par If patient does require kyphoplasty sooner rather than later we can more easily consider Prolia.  I requested all recent blood test be sent for review.  Prior vitamin D level was normal at 29.7 calcium 9.9 creatinine normal 1.08.  The patient is taking high-dose vitamin D 50,000/week.

## 2021-01-13 NOTE — DISCUSSION/SUMMARY
[Medication Risks Reviewed] : Medication risks reviewed [Surgical risks reviewed] : Surgical risks reviewed [de-identified] : The patient presents with symptoms consistent with significant back pain associated with 2 fraction lumbar spine the original at L3 which is stabilized and the likely new fracture at T12 which is progressive when compared with last evaluation.  At this time recommended patient consider cement augmentation to stabilize the T12 fracture and consider the L3 level at the same time.  For now he will continue with his TLSO.  He has seen an endocrinologist and has been recommended Prolia and Forteo which he may consider as well in addition to cement augmentation.  Any surgical treatment would involve obtaining medical evaluation and clearance through his liver transplant team.  Will contact the office if he would like to discuss any other details regarding the proposed T12 and L3 kyphoplasties.\par \par The patient was advised that based upon their clinical presentation and radiographic findings they meet inclusion criteria for spinal surgery to address their spinal pathology.\par The spectrum of treatments for their spinal condition were reviewed in detail. The goals, alternatives, risks and benefits of spinal surgery were reviewed in detail with the patient.  \par Benefits and risks of operative and nonoperative treatment for the patient's pathology were outlined at length with the patient.  Specifically, those risks are understood to be, but not limited to, bleeding, infection, fracture (both during surgery and after surgery), adjacent level disease, failure to heal (significantly increased by smoking), need for further surgery, failure of instrumentation (if used), recurrence of problem and symptoms, neurovascular injury, dural tears or leaks resulting in spinal fluid leakage and requiring additional invasive and non-invasive treatments, need for additional procedures, possible paralysis resulting in loss of use of arms, legs, bowel and bladder function as well as sexual dysfunction, and anesthetic risks including death. \par Available alternatives to surgery were also discussed with the patient. In addition, the patient further understands that there may be indicated need for somatosensory evoked potential monitoring, real-time EMG monitoring, and motor evoked potential monitoring during the procedure along with placement of needle electrodes. A neuromonitoring service will discuss the risks and benefits separately with the patient.\par The patient also understands that having a surgical procedure and being hospitalized carries risks in addition to the ones described above.\par \par The patient was advised that Dr. Espana operates as a surgical team with his associate Dr. Montes De Oca and/or with surgical Physician Assistants (PA)\par \par The patient was advised that they will require a medical preoperative risk evaluation by their PCP. Further medical subspecialty clearances such as cardiac may be indicated if felt needed by their PCP.\par \par The patient was advised he/she may call our office after careful consideration of their treatment options and further consultation with any other physician to begin the process of preoperative planning for elective spinal surgery at a time of their choosing. \par The patient verbalized an understanding of the procedure, its indications, and its common potential complications and attendant risks, and is willing to proceed. No guarantees were made with regard to a complete recovery. We will proceed in a timely manner after obtaining medical clearance.

## 2021-01-13 NOTE — REASON FOR VISIT
[Follow-Up Visit] : a follow-up visit for [FreeTextEntry2] : Follow up visit for x-rays for L3 and T12 fracture.

## 2021-01-13 NOTE — PROCEDURE
[FreeTextEntry1] : Bone mineral density January 11, 2021\par Indication recent compression fractures new chronic steroids.\par Compared to outside study 2019\par Spine excluding compression L3 -3.4 osteoporosis prior -2.7\par Total hip -2.9 osteoporosis prior -2.3\par Femoral neck -3.4 osteoporosis prior \par Proximal radius -5.3, severe osteoporosis no prior -2.7

## 2021-01-13 NOTE — CONSULT LETTER
[Dear  ___] : Dear  [unfilled], [Courtesy Letter:] : I had the pleasure of seeing your patient, [unfilled], in my office today. [Please see my note below.] : Please see my note below. [Consult Closing:] : Thank you very much for allowing me to participate in the care of this patient.  If you have any questions, please do not hesitate to contact me. [Sincerely,] : Sincerely, [FreeTextEntry2] : Portillo Mckeon MD [DrJordan  ___] : Dr. JOVEL [DrJordan ___] : Dr. JOVEL

## 2021-01-13 NOTE — HISTORY OF PRESENT ILLNESS
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  Previously patient had history of coronary artery disease status post bypass surgery December 2019.  Long history of kidney stones last passed 2019.  The patient recently developed severe back pain has been found to have T12 and L3 compression fractures.  The patient has major persistent pain despite wearing a brace.  He has been seen by orthopedic surgery and is considering kyphoplasty for pain relief.  The patient has no previous history of significant bone disease.  He did have previous metatarsal fractures and a traumatic radius fracture.\par The patient has been on prednisone as part of the transplant therapy since November 2020.\par Bone mineral density September 2019 was moderately low.  Spine -2.7 total hip -2.3 femoral neck -2.7.  The patient did not begin medical osteoporosis therapy at that time. [Taking Steroids] : a history of taking steroids [Previous Fragility Fracture] : previous fragility fracture(s)

## 2021-01-13 NOTE — HISTORY OF PRESENT ILLNESS
[de-identified] : 60 year old patient presenting today for follow up x-rays of his L3 and T12 fracture. Patient reports that there is no improvement in pain since using the TLSO brace (he reports that pain is sometimes worse with the TLSO brace). Pain is mostly in the right lower back and is 8-9/10. He is using tramadol 50 mg BID-TID, oxycodone 5mg HS and tylenol 500 mg BID with improvement in pain after taking medications. He is unable to take NSAIDs due to hx of liver transplant and use of immunosuppressant medications.

## 2021-01-13 NOTE — PHYSICAL EXAM
[Stooped] : stooped [Walker] : ambulates with walker [LE] : Sensory: Intact in bilateral lower extremities [0] : left ankle jerk 0 [DP] : dorsalis pedis 2+ and symmetric bilaterally [SLR] : negative straight leg raise [Plantar Reflex Right Only] : absent on the right [Plantar Reflex Left Only] : absent on the left [DTR Reflexes Clonus Of Right Ankle (___ Beats)] : absent on the right [DTR Reflexes Clonus Of Left Ankle (___ Beats)] : absent on the left [de-identified] : seen with his wife\par The pt is awake, alert and oriented to self, place and time, is uncomfortable and in no acute distress. Inspection of neck, back and lower extremities bilaterally reveals no rashes.   There is no focal tenderness over the cervical, thoracic spine, or the paraspinal or upper and lower extremities musculature. There is no sacroiliac tenderness. No greater trochanteric tenderness bilaterally. No atrophy or abnormal movements noted in the upper or lower extremities. There is no swelling noted in the upper or lower extremities bilaterally. No cervical lymphadenopathy noted anteriorly. No joint laxity noted in the upper and lower extremity joints bilaterally.\par Hip range of motion is degrees internal rotation 30° external rotation without pain. Full range of motion of the shoulders bilaterally with no significant pain\par There is no groin pain with hip internal rotation and a negative JEWELS test bilaterally.  [de-identified] : 3 views lumbar spine obtained today were compared with x-rays obtained at his last visit.  Previously noted left-sided lumbar curve is again seen.  On the lateral projection previously noted compression deformity of the L3 vertebral body is noted with no significant interval change.  No further compression or change in lordosis is seen in the lumbar spine associated with the L3 fracture.  There is approximately 50% loss of vertebral body height centrally with a biconcave deformity noted.  Vascular clips are noted in the pelvis.  Calcification of the abdominal aorta.  On the lateral projection significant loss of vertebral body height anteriorly at the T12 with approximately 80% loss of vertebral body height anteriorly which is progressed when compared with previous x-ray.  Segmental kyphosis measures approximately 40 degrees from T11-L1.  This is also slightly progressed.  No acute fractures are noted.  Bracing does not improve the kyphosis significantly. [de-identified] : Healed chevron incision over the abdomen\par Seen with a TLSO

## 2021-01-13 NOTE — PHYSICAL EXAM
[Alert] : alert [Well Nourished] : well nourished [Normal Sclera/Conjunctiva] : normal sclera/conjunctiva [EOMI] : extra ocular movement intact [No Proptosis] : no proptosis [Thyroid Not Enlarged] : the thyroid was not enlarged [No Thyroid Nodules] : no palpable thyroid nodules [Clear to Auscultation] : lungs were clear to auscultation bilaterally [Normal S1, S2] : normal S1 and S2 [Normal Rate] : heart rate was normal [Regular Rhythm] : with a regular rhythm [No Edema] : no peripheral edema [Pedal Pulses Normal] : the pedal pulses are present [Normal Bowel Sounds] : normal bowel sounds [Not Tender] : non-tender [Not Distended] : not distended [Soft] : abdomen soft [Normal Anterior Cervical Nodes] : no anterior cervical lymphadenopathy [Normal Posterior Cervical Nodes] : no posterior cervical lymphadenopathy [No Spinal Tenderness] : no spinal tenderness [Kyphosis] : kyphosis present [No Stigmata of Cushings Syndrome] : no stigmata of Cushings Syndrome [No Rash] : no rash [Acanthosis Nigricans] : no acanthosis nigricans [Normal Reflexes] : deep tendon reflexes were 2+ and symmetric [Oriented x3] : oriented to person, place, and time [de-identified] : appears uncomfortable.  [de-identified] : anicteric [de-identified] : wearing back brace

## 2021-01-14 ENCOUNTER — OUTPATIENT (OUTPATIENT)
Dept: OUTPATIENT SERVICES | Facility: HOSPITAL | Age: 61
LOS: 1 days | End: 2021-01-14
Payer: COMMERCIAL

## 2021-01-14 ENCOUNTER — NON-APPOINTMENT (OUTPATIENT)
Age: 61
End: 2021-01-14

## 2021-01-14 VITALS
HEIGHT: 65 IN | HEART RATE: 80 BPM | DIASTOLIC BLOOD PRESSURE: 81 MMHG | WEIGHT: 171.96 LBS | OXYGEN SATURATION: 99 % | TEMPERATURE: 97 F | SYSTOLIC BLOOD PRESSURE: 111 MMHG | RESPIRATION RATE: 14 BRPM

## 2021-01-14 DIAGNOSIS — Z94.4 LIVER TRANSPLANT STATUS: Chronic | ICD-10-CM

## 2021-01-14 DIAGNOSIS — Z94.4 LIVER TRANSPLANT STATUS: ICD-10-CM

## 2021-01-14 DIAGNOSIS — Z98.890 OTHER SPECIFIED POSTPROCEDURAL STATES: Chronic | ICD-10-CM

## 2021-01-14 DIAGNOSIS — M54.5 LOW BACK PAIN: ICD-10-CM

## 2021-01-14 DIAGNOSIS — S22.000A WEDGE COMPRESSION FRACTURE OF UNSPECIFIED THORACIC VERTEBRA, INITIAL ENCOUNTER FOR CLOSED FRACTURE: ICD-10-CM

## 2021-01-14 DIAGNOSIS — Z01.818 ENCOUNTER FOR OTHER PREPROCEDURAL EXAMINATION: ICD-10-CM

## 2021-01-14 DIAGNOSIS — Z95.1 PRESENCE OF AORTOCORONARY BYPASS GRAFT: Chronic | ICD-10-CM

## 2021-01-14 DIAGNOSIS — M48.48XA FATIGUE FRACTURE OF VERTEBRA, SACRAL AND SACROCOCCYGEAL REGION, INITIAL ENCOUNTER FOR FRACTURE: ICD-10-CM

## 2021-01-14 NOTE — H&P PST ADULT - HISTORY OF PRESENT ILLNESS
this is a 59 y/o male who had a liver transplant 11/20, has been on prednisone and now has osteoporosis and developed 2 compression fractures; to have kyphoplasty

## 2021-01-14 NOTE — H&P PST ADULT - NSICDXPASTMEDICALHX_GEN_ALL_CORE_FT
PAST MEDICAL HISTORY:  3-vessel CAD     Essential thrombocytopenia resolved with transplant    [place] syndrome     H/O osteoporosis     H/O right bundle branch block     Kidney stone 2019    Liver cirrhosis cryptogenic    Lumbar compression fracture 2021    Pleural effusion lower left, related to heart surgery, resolving    Thoracic compression fracture 2021

## 2021-01-14 NOTE — H&P PST ADULT - NSICDXPROBLEM_GEN_ALL_CORE_FT
PROBLEM DIAGNOSES  Problem: S/P liver transplant  Assessment and Plan: speak with hepatologist about whether patient has to stop these meds    Problem: Low back pain  Assessment and Plan: kyphoplasty T12 and L3 and all related procedures; covid appt given, to have medical, hepatic and cardio clearance, had bloodwork, to have PT/Ptt tomorrow, had ekg

## 2021-01-14 NOTE — H&P PST ADULT - NSICDXFAMILYHX_GEN_ALL_CORE_FT
FAMILY HISTORY:  Family history of glioblastoma, father  FH: cirrhosis, cryptogenic liver cirrhosis-mother

## 2021-01-14 NOTE — H&P PST ADULT - NSICDXPASTSURGICALHX_GEN_ALL_CORE_FT
PAST SURGICAL HISTORY:  S/P CABG x 1 3 vessel-12/19    S/P cystoscopy 1996    S/P liver transplant 11/20

## 2021-01-14 NOTE — PROVIDER CONTACT NOTE (OTHER) - ASSESSMENT
The Spine Pre-Operative Education packet was given to the patient on 1/13/21. The patient and NP reviewed the information included in the packet. All his questions were answered and he gave a clear understanding of the instructions. He was advised to call the office at any time with further questions or concerns.

## 2021-01-15 ENCOUNTER — NON-APPOINTMENT (OUTPATIENT)
Age: 61
End: 2021-01-15

## 2021-01-15 ENCOUNTER — LABORATORY RESULT (OUTPATIENT)
Age: 61
End: 2021-01-15

## 2021-01-15 ENCOUNTER — APPOINTMENT (OUTPATIENT)
Dept: INTERNAL MEDICINE | Facility: CLINIC | Age: 61
End: 2021-01-15
Payer: COMMERCIAL

## 2021-01-15 VITALS
HEART RATE: 85 BPM | WEIGHT: 170 LBS | BODY MASS INDEX: 29.02 KG/M2 | SYSTOLIC BLOOD PRESSURE: 120 MMHG | DIASTOLIC BLOOD PRESSURE: 62 MMHG | OXYGEN SATURATION: 98 % | HEIGHT: 64 IN | TEMPERATURE: 97.6 F

## 2021-01-15 DIAGNOSIS — Z01.818 ENCOUNTER FOR OTHER PREPROCEDURAL EXAMINATION: ICD-10-CM

## 2021-01-15 DIAGNOSIS — J90 PLEURAL EFFUSION, NOT ELSEWHERE CLASSIFIED: ICD-10-CM

## 2021-01-15 DIAGNOSIS — S22.080A WEDGE COMPRESSION FRACTURE OF T11-T12 VERTEBRA, INITIAL ENCOUNTER FOR CLOSED FRACTURE: ICD-10-CM

## 2021-01-15 LAB
BILIRUB UR QL STRIP: ABNORMAL
CLARITY UR: CLEAR
COLLECTION METHOD: NORMAL
GLUCOSE UR-MCNC: NORMAL
HCG UR QL: 0.2 EU/DL
HGB UR QL STRIP.AUTO: ABNORMAL
KETONES UR-MCNC: ABNORMAL
LEUKOCYTE ESTERASE UR QL STRIP: NORMAL
NITRITE UR QL STRIP: NORMAL
PH UR STRIP: 5.5
PROT UR STRIP-MCNC: 30
SP GR UR STRIP: 1.03

## 2021-01-15 PROCEDURE — 93000 ELECTROCARDIOGRAM COMPLETE: CPT

## 2021-01-15 PROCEDURE — 36415 COLL VENOUS BLD VENIPUNCTURE: CPT

## 2021-01-15 PROCEDURE — 99214 OFFICE O/P EST MOD 30 MIN: CPT | Mod: 25

## 2021-01-15 PROCEDURE — 81003 URINALYSIS AUTO W/O SCOPE: CPT | Mod: NC,QW

## 2021-01-15 PROCEDURE — 99072 ADDL SUPL MATRL&STAF TM PHE: CPT

## 2021-01-15 RX ORDER — APREPITANT 80 MG/1
40 CAPSULE ORAL ONCE
Refills: 0 | Status: DISCONTINUED | OUTPATIENT
Start: 2021-01-19 | End: 2021-01-19

## 2021-01-15 RX ORDER — OXYCODONE 5 MG/1
5 TABLET ORAL
Qty: 45 | Refills: 0 | Status: COMPLETED | COMMUNITY
End: 2021-01-15

## 2021-01-15 RX ORDER — TACROLIMUS 1 MG/1
1 CAPSULE ORAL TWICE DAILY
Qty: 60 | Refills: 0 | Status: ACTIVE | COMMUNITY

## 2021-01-15 RX ORDER — PREDNISONE 10 MG/1
10 TABLET ORAL
Refills: 0 | Status: COMPLETED | COMMUNITY
End: 2021-01-15

## 2021-01-15 RX ORDER — OXYCODONE 5 MG/1
5 TABLET ORAL
Qty: 20 | Refills: 0 | Status: COMPLETED | COMMUNITY
Start: 2021-01-13 | End: 2021-01-15

## 2021-01-15 RX ORDER — TACROLIMUS 1 MG/1
1 CAPSULE ORAL
Refills: 0 | Status: COMPLETED | COMMUNITY
End: 2021-01-15

## 2021-01-15 RX ORDER — SULFAMETHOXAZOLE AND TRIMETHOPRIM 800; 160 MG/1; MG/1
800-160 TABLET ORAL
Refills: 0 | Status: COMPLETED | COMMUNITY
End: 2021-01-15

## 2021-01-15 NOTE — PHYSICAL EXAM
[No Acute Distress] : no acute distress [Well Nourished] : well nourished [Well Developed] : well developed [Well-Appearing] : well-appearing [Normal Sclera/Conjunctiva] : normal sclera/conjunctiva [PERRL] : pupils equal round and reactive to light [EOMI] : extraocular movements intact [Normal Oropharynx] : the oropharynx was normal [Normal Outer Ear/Nose] : the outer ears and nose were normal in appearance [Normal TMs] : both tympanic membranes were normal [No JVD] : no jugular venous distention [No Lymphadenopathy] : no lymphadenopathy [Supple] : supple [No Respiratory Distress] : no respiratory distress  [No Accessory Muscle Use] : no accessory muscle use [Clear to Auscultation] : lungs were clear to auscultation bilaterally [Normal Rate] : normal rate  [Regular Rhythm] : with a regular rhythm [Normal S1, S2] : normal S1 and S2 [No Carotid Bruits] : no carotid bruits [No Abdominal Bruit] : a ~M bruit was not heard ~T in the abdomen [Pedal Pulses Present] : the pedal pulses are present [No Palpable Aorta] : no palpable aorta [No Extremity Clubbing/Cyanosis] : no extremity clubbing/cyanosis [Soft] : abdomen soft [Non Tender] : non-tender [Non-distended] : non-distended [No Masses] : no abdominal mass palpated [No HSM] : no HSM [Normal Bowel Sounds] : normal bowel sounds [Normal Posterior Cervical Nodes] : no posterior cervical lymphadenopathy [Normal Anterior Cervical Nodes] : no anterior cervical lymphadenopathy [No CVA Tenderness] : no CVA  tenderness [No Spinal Tenderness] : no spinal tenderness [Kyphosis] : kyphosis [No Joint Swelling] : no joint swelling [Grossly Normal Strength/Tone] : grossly normal strength/tone [No Rash] : no rash [Coordination Grossly Intact] : coordination grossly intact [No Focal Deficits] : no focal deficits [1+] : left 1+ [0] : left 0 [Speech Grossly Normal] : speech grossly normal [Normal Affect] : the affect was normal [Alert and Oriented x3] : oriented to person, place, and time [Normal Mood] : the mood was normal [Normal Insight/Judgement] : insight and judgment were intact [de-identified] : d [de-identified] : 1 plus edema b/l LE  [de-identified] : surgical incision well healed  exam limited pt could not lay down supine -   negative asterixis sign  [de-identified] : pain lower bck  severe kyphosis  [de-identified] : slight resting tremor left hand

## 2021-01-15 NOTE — ASSESSMENT
[Patient Optimized for Surgery] : Patient optimized for surgery [Cardiology consultation] : Cardiology consultation [Modify anti-platelet treatment prior to procedure] : Modify anti-platelet treatment prior to procedure [Modify medications prior to procedure] : Modify medications prior to procedure [FreeTextEntry4] : Mr. BRO is a 60 year  male, who present to the office for medical clearance for T 12 kyphoplasty \par \par Patient needs to be cleared by cardiology  [FreeTextEntry6] : hold ASA  [FreeTextEntry7] : hold MVI x 7 days

## 2021-01-15 NOTE — RESULTS/DATA
[] : results reviewed [de-identified] : Pending  [de-identified] : pending  [de-identified] : pending  [de-identified] : RBBB   motion artifact on EKG noted

## 2021-01-15 NOTE — PLAN
[FreeTextEntry1] : Patient history, physical and EKG  was reviewed by  Dr. Portillo Mckeon. at the time of the patient visit. \par \par EDI BRO  was advised not to take any NSAIDs, ASA, Vitamin E, omega 3, ginkgo biloba or MVI 7 days prior to the surgery. \par Advised to get a covid PCR 72 hours prior to the procedure -  Covid swab needs to be negative to be cleared for the surgery.   Advised after having the COVID swab he should quarantine until the surgical procedure is completed.  \par \par Advised to wear compression stockings, low sodium  diet \par \par Refer to cardiac clearance by Dr. Roby DOTSON \par \par Pending CBC, CMP, PT,PTT,INR and a1c

## 2021-01-15 NOTE — HISTORY OF PRESENT ILLNESS
[Aortic Stenosis] : aortic stenosis [Coronary Artery Disease] : coronary artery disease [(Patient denies any chest pain, claudication, dyspnea on exertion, orthopnea, palpitations or syncope)] : Patient denies any chest pain, claudication, dyspnea on exertion, orthopnea, palpitations or syncope [Spouse] : spouse [Atrial Fibrillation] : no atrial fibrillation [Recent Myocardial Infarction] : no recent myocardial infarction [Implantable Device/Pacemaker] : no implantable device/pacemaker [Asthma] : no asthma [COPD] : no COPD [Sleep Apnea] : no sleep apnea [Smoker] : not a smoker [Family Member] : no family member with adverse anesthesia reaction/sudden death [Self] : no previous adverse anesthesia reaction [Chronic Anticoagulation] : no chronic anticoagulation [Chronic Kidney Disease] : no chronic kidney disease [Diabetes] : no diabetes [FreeTextEntry2] : 01/19/21 [FreeTextEntry1] : Kyphoplasty T12  [FreeTextEntry3] : Dr. Espana [FreeTextEntry4] : Mr. BRO is a 60 year  male, who present to the office for medical clearance.  Compa states he is schedule for T12 kyphoplasty on 01/19/20.\par Since his liver transplant been suffering from chronic LBP and was dx with T12 an L3 compression fracture.  \par Denies feeling chest pain, SOB, SANCHEZ.  LAst week had some constipation but relieved with Colace.  \par \par States he was cardiac cleared by Dr Roby Gutierrez.   [FreeTextEntry7] : Had cardiac clearance  by Dr. Roby DOTSON

## 2021-01-15 NOTE — ASU PATIENT PROFILE, ADULT - PMH
3-vessel CAD    Essential thrombocytopenia  resolved with transplant  [place] syndrome    H/O osteoporosis    H/O right bundle branch block    Kidney stone  2019  Liver cirrhosis  cryptogenic  Lumbar compression fracture  2021  Pleural effusion  lower left, related to heart surgery, resolving  Thoracic compression fracture  2021

## 2021-01-15 NOTE — REVIEW OF SYSTEMS
[Lower Ext Edema] : lower extremity edema [Back Pain] : back pain [Fainting] : fainting [Negative] : Neurological [Fever] : no fever [Chills] : no chills [Fatigue] : no fatigue [Night Sweats] : no night sweats [Pain] : no pain [Vision Problems] : no vision problems [Itching] : no itching [Earache] : no earache [Nosebleeds] : no nosebleeds [Nasal Discharge] : no nasal discharge [Sore Throat] : no sore throat [Chest Pain] : no chest pain [Palpitations] : no palpitations [Claudication] : no  leg claudication [Orthopena] : no orthopnea [Shortness Of Breath] : no shortness of breath [Wheezing] : no wheezing [Cough] : no cough [Dyspnea on Exertion] : not dyspnea on exertion [Abdominal Pain] : no abdominal pain [Nausea] : no nausea [Constipation] : no constipation [Vomiting] : no vomiting [Heartburn] : no heartburn [Melena] : no melena [Dysuria] : no dysuria [Nocturia] : no nocturia [Hematuria] : no hematuria [Frequency] : no frequency [Muscle Pain] : no muscle pain [Muscle Weakness] : no muscle weakness [Itching] : no itching [Skin Rash] : no skin rash [Headache] : no headache [Dizziness] : no dizziness [Memory Loss] : no memory loss [Swollen Glands] : no swollen glands

## 2021-01-17 ENCOUNTER — OUTPATIENT (OUTPATIENT)
Dept: OUTPATIENT SERVICES | Facility: HOSPITAL | Age: 61
LOS: 1 days | End: 2021-01-17
Payer: COMMERCIAL

## 2021-01-17 DIAGNOSIS — Z20.828 CONTACT WITH AND (SUSPECTED) EXPOSURE TO OTHER VIRAL COMMUNICABLE DISEASES: ICD-10-CM

## 2021-01-17 DIAGNOSIS — Z98.890 OTHER SPECIFIED POSTPROCEDURAL STATES: Chronic | ICD-10-CM

## 2021-01-17 DIAGNOSIS — Z94.4 LIVER TRANSPLANT STATUS: Chronic | ICD-10-CM

## 2021-01-17 DIAGNOSIS — Z95.1 PRESENCE OF AORTOCORONARY BYPASS GRAFT: Chronic | ICD-10-CM

## 2021-01-17 LAB — SARS-COV-2 RNA SPEC QL NAA+PROBE: SIGNIFICANT CHANGE UP

## 2021-01-17 PROCEDURE — U0003: CPT

## 2021-01-17 PROCEDURE — U0005: CPT

## 2021-01-18 ENCOUNTER — APPOINTMENT (OUTPATIENT)
Dept: INTERNAL MEDICINE | Facility: CLINIC | Age: 61
End: 2021-01-18
Payer: COMMERCIAL

## 2021-01-18 VITALS
OXYGEN SATURATION: 93 % | BODY MASS INDEX: 29.02 KG/M2 | TEMPERATURE: 97.7 F | HEIGHT: 64 IN | HEART RATE: 61 BPM | WEIGHT: 170 LBS

## 2021-01-18 DIAGNOSIS — Z11.1 ENCOUNTER FOR SCREENING FOR RESPIRATORY TUBERCULOSIS: ICD-10-CM

## 2021-01-18 PROBLEM — S32.000A WEDGE COMPRESSION FRACTURE OF UNSPECIFIED LUMBAR VERTEBRA, INITIAL ENCOUNTER FOR CLOSED FRACTURE: Chronic | Status: ACTIVE | Noted: 2021-01-14

## 2021-01-18 PROBLEM — Z86.79 PERSONAL HISTORY OF OTHER DISEASES OF THE CIRCULATORY SYSTEM: Chronic | Status: ACTIVE | Noted: 2021-01-14

## 2021-01-18 PROBLEM — D69.3 IMMUNE THROMBOCYTOPENIC PURPURA: Chronic | Status: ACTIVE | Noted: 2019-02-24

## 2021-01-18 PROBLEM — J90 PLEURAL EFFUSION, NOT ELSEWHERE CLASSIFIED: Chronic | Status: ACTIVE | Noted: 2021-01-14

## 2021-01-18 PROBLEM — K74.60 UNSPECIFIED CIRRHOSIS OF LIVER: Chronic | Status: ACTIVE | Noted: 2020-02-21

## 2021-01-18 PROBLEM — I25.10 ATHEROSCLEROTIC HEART DISEASE OF NATIVE CORONARY ARTERY WITHOUT ANGINA PECTORIS: Chronic | Status: ACTIVE | Noted: 2021-01-14

## 2021-01-18 PROBLEM — S22.000A WEDGE COMPRESSION FRACTURE OF UNSPECIFIED THORACIC VERTEBRA, INITIAL ENCOUNTER FOR CLOSED FRACTURE: Chronic | Status: ACTIVE | Noted: 2021-01-14

## 2021-01-18 PROBLEM — Z87.39 PERSONAL HISTORY OF OTHER DISEASES OF THE MUSCULOSKELETAL SYSTEM AND CONNECTIVE TISSUE: Chronic | Status: ACTIVE | Noted: 2021-01-14

## 2021-01-18 PROBLEM — N20.0 CALCULUS OF KIDNEY: Chronic | Status: ACTIVE | Noted: 2021-01-14

## 2021-01-18 LAB
ALBUMIN SERPL ELPH-MCNC: 4.4 G/DL
ALP BLD-CCNC: 115 U/L
ALT SERPL-CCNC: 18 U/L
ANION GAP SERPL CALC-SCNC: 14 MMOL/L
APPEARANCE: ABNORMAL
AST SERPL-CCNC: 21 U/L
BASOPHILS # BLD AUTO: 0.04 K/UL
BASOPHILS NFR BLD AUTO: 1 %
BILIRUB SERPL-MCNC: 2.9 MG/DL
BILIRUBIN URINE: NEGATIVE
BLOOD URINE: NEGATIVE
BUN SERPL-MCNC: 22 MG/DL
CALCIUM SERPL-MCNC: 9.6 MG/DL
CHLORIDE SERPL-SCNC: 102 MMOL/L
CO2 SERPL-SCNC: 23 MMOL/L
COLOR: ABNORMAL
CREAT SERPL-MCNC: 1.21 MG/DL
EOSINOPHIL # BLD AUTO: 0.04 K/UL
EOSINOPHIL NFR BLD AUTO: 1 %
ESTIMATED AVERAGE GLUCOSE: 74 MG/DL
GLUCOSE QUALITATIVE U: NEGATIVE
GLUCOSE SERPL-MCNC: 107 MG/DL
HBA1C MFR BLD HPLC: 4.2 %
HCT VFR BLD CALC: 35.2 %
HGB BLD-MCNC: 11.7 G/DL
KETONES URINE: NEGATIVE
LEUKOCYTE ESTERASE URINE: NEGATIVE
LYMPHOCYTES # BLD AUTO: 0.81 K/UL
LYMPHOCYTES NFR BLD AUTO: 19 %
MAN DIFF?: NORMAL
MCHC RBC-ENTMCNC: 33.2 GM/DL
MCHC RBC-ENTMCNC: 33.5 PG
MCV RBC AUTO: 100.9 FL
MONOCYTES # BLD AUTO: 0.38 K/UL
MONOCYTES NFR BLD AUTO: 9 %
NEUTROPHILS # BLD AUTO: 2.99 K/UL
NEUTROPHILS NFR BLD AUTO: 70 %
NITRITE URINE: NEGATIVE
PH URINE: 5.5
PLATELET # BLD AUTO: 120 K/UL
POTASSIUM SERPL-SCNC: 4.6 MMOL/L
PROT SERPL-MCNC: 6.6 G/DL
PROTEIN URINE: ABNORMAL
RBC # BLD: 3.49 M/UL
RBC # FLD: 16.4 %
SODIUM SERPL-SCNC: 138 MMOL/L
SPECIFIC GRAVITY URINE: 1.03
UROBILINOGEN URINE: NORMAL
WBC # FLD AUTO: 4.27 K/UL

## 2021-01-18 PROCEDURE — 99212 OFFICE O/P EST SF 10 MIN: CPT | Mod: 25

## 2021-01-18 PROCEDURE — 99072 ADDL SUPL MATRL&STAF TM PHE: CPT

## 2021-01-18 PROCEDURE — 36415 COLL VENOUS BLD VENIPUNCTURE: CPT

## 2021-01-18 NOTE — HISTORY OF PRESENT ILLNESS
[FreeTextEntry1] : Repeat labs and TB screening  [de-identified] : Mr. BRO is a 60 year  male, who present to the office for repeat PT/INr, PTT for surgical procedure \par Also need to get TB test for credentialing \par \par Denies having a prior positive TB test in the past, denies fever, chills, night sweats \par

## 2021-01-18 NOTE — PHYSICAL EXAM
[No Acute Distress] : no acute distress [Well Nourished] : well nourished [Well Developed] : well developed [Well-Appearing] : well-appearing [Normal Sclera/Conjunctiva] : normal sclera/conjunctiva [PERRL] : pupils equal round and reactive to light [EOMI] : extraocular movements intact [Normal Outer Ear/Nose] : the outer ears and nose were normal in appearance [Normal Oropharynx] : the oropharynx was normal [Normal TMs] : both tympanic membranes were normal [No JVD] : no jugular venous distention [No Lymphadenopathy] : no lymphadenopathy [Supple] : supple [No Respiratory Distress] : no respiratory distress  [No Accessory Muscle Use] : no accessory muscle use [Clear to Auscultation] : lungs were clear to auscultation bilaterally [Normal Rate] : normal rate  [Regular Rhythm] : with a regular rhythm [Normal S1, S2] : normal S1 and S2 [No Abdominal Bruit] : a ~M bruit was not heard ~T in the abdomen [No Carotid Bruits] : no carotid bruits [Pedal Pulses Present] : the pedal pulses are present [No Palpable Aorta] : no palpable aorta [No Extremity Clubbing/Cyanosis] : no extremity clubbing/cyanosis [Soft] : abdomen soft [Non Tender] : non-tender [No Masses] : no abdominal mass palpated [Non-distended] : non-distended [No HSM] : no HSM [Normal Bowel Sounds] : normal bowel sounds [Normal Posterior Cervical Nodes] : no posterior cervical lymphadenopathy [Normal Anterior Cervical Nodes] : no anterior cervical lymphadenopathy [No CVA Tenderness] : no CVA  tenderness [No Spinal Tenderness] : no spinal tenderness [No Joint Swelling] : no joint swelling [Kyphosis] : kyphosis [Grossly Normal Strength/Tone] : grossly normal strength/tone [No Rash] : no rash [No Focal Deficits] : no focal deficits [Coordination Grossly Intact] : coordination grossly intact [1+] : left 1+ [0] : left 0 [Speech Grossly Normal] : speech grossly normal [Normal Affect] : the affect was normal [Alert and Oriented x3] : oriented to person, place, and time [Normal Mood] : the mood was normal [Normal Insight/Judgement] : insight and judgment were intact [de-identified] : 1 plus edema b/l LE  [de-identified] : surgical incision well healed  exam limited pt could not lay down supine -   negative asterixis sign  [de-identified] : pain lower back  severe kyphosis   wearing back brace  [de-identified] : slight resting tremor left hand

## 2021-01-18 NOTE — REVIEW OF SYSTEMS
[Recent Change In Weight] : ~T recent weight change [Negative] : Heme/Lymph [Fever] : no fever [Chills] : no chills [Hot Flashes] : no hot flashes [Discharge] : no discharge [Pain] : no pain [Itching] : no itching [Earache] : no earache [Sore Throat] : no sore throat [Shortness Of Breath] : no shortness of breath [Cough] : no cough [Abdominal Pain] : no abdominal pain [Vomiting] : no vomiting [Back Pain] : no back pain [Skin Rash] : no skin rash [Headache] : no headache [Memory Loss] : no memory loss [Anxiety] : no anxiety [Depression] : no depression [Swollen Glands] : no swollen glands

## 2021-01-18 NOTE — PLAN
[FreeTextEntry1] : Discussed recent labs in detail  with Dr. Portillo Mckeon and Pr-op clearance form  Dr. Mckeon states he is cleared for the surgical procedure.\par Repeat PTT, PTT and INR  \par \par Screening for TB -  filled out form pending TB gold test \par Check labs \par \par I spent 10  Minutes with the patient, half of which we discussed finding on physical exam  and coordinated care.  As well as reviewed my plans and follow ups. \par

## 2021-01-19 ENCOUNTER — RESULT REVIEW (OUTPATIENT)
Age: 61
End: 2021-01-19

## 2021-01-19 ENCOUNTER — APPOINTMENT (OUTPATIENT)
Dept: ORTHOPEDIC SURGERY | Facility: HOSPITAL | Age: 61
End: 2021-01-19

## 2021-01-19 ENCOUNTER — OUTPATIENT (OUTPATIENT)
Dept: OUTPATIENT SERVICES | Facility: HOSPITAL | Age: 61
LOS: 1 days | End: 2021-01-19
Payer: COMMERCIAL

## 2021-01-19 VITALS
DIASTOLIC BLOOD PRESSURE: 78 MMHG | HEART RATE: 89 BPM | TEMPERATURE: 98 F | RESPIRATION RATE: 18 BRPM | SYSTOLIC BLOOD PRESSURE: 145 MMHG | OXYGEN SATURATION: 98 %

## 2021-01-19 VITALS
WEIGHT: 169.09 LBS | RESPIRATION RATE: 16 BRPM | OXYGEN SATURATION: 95 % | SYSTOLIC BLOOD PRESSURE: 111 MMHG | DIASTOLIC BLOOD PRESSURE: 81 MMHG | HEIGHT: 65 IN | TEMPERATURE: 98 F | HEART RATE: 81 BPM

## 2021-01-19 DIAGNOSIS — Z98.890 OTHER SPECIFIED POSTPROCEDURAL STATES: Chronic | ICD-10-CM

## 2021-01-19 DIAGNOSIS — M48.48XA FATIGUE FRACTURE OF VERTEBRA, SACRAL AND SACROCOCCYGEAL REGION, INITIAL ENCOUNTER FOR FRACTURE: ICD-10-CM

## 2021-01-19 DIAGNOSIS — S22.000A WEDGE COMPRESSION FRACTURE OF UNSPECIFIED THORACIC VERTEBRA, INITIAL ENCOUNTER FOR CLOSED FRACTURE: ICD-10-CM

## 2021-01-19 DIAGNOSIS — Z95.1 PRESENCE OF AORTOCORONARY BYPASS GRAFT: Chronic | ICD-10-CM

## 2021-01-19 DIAGNOSIS — Z94.4 LIVER TRANSPLANT STATUS: Chronic | ICD-10-CM

## 2021-01-19 LAB
ABO RH CONFIRMATION: SIGNIFICANT CHANGE UP
APTT BLD: 31.3 SEC
INR PPP: 1.16 RATIO
PT BLD: 13.6 SEC

## 2021-01-19 PROCEDURE — 97161 PT EVAL LOW COMPLEX 20 MIN: CPT

## 2021-01-19 PROCEDURE — 86850 RBC ANTIBODY SCREEN: CPT

## 2021-01-19 PROCEDURE — 86900 BLOOD TYPING SEROLOGIC ABO: CPT

## 2021-01-19 PROCEDURE — 86901 BLOOD TYPING SEROLOGIC RH(D): CPT

## 2021-01-19 PROCEDURE — 88342 IMHCHEM/IMCYTCHM 1ST ANTB: CPT

## 2021-01-19 PROCEDURE — 22513 PERQ VERTEBRAL AUGMENTATION: CPT

## 2021-01-19 PROCEDURE — 76000 FLUOROSCOPY <1 HR PHYS/QHP: CPT

## 2021-01-19 PROCEDURE — C1726: CPT

## 2021-01-19 PROCEDURE — 36415 COLL VENOUS BLD VENIPUNCTURE: CPT

## 2021-01-19 PROCEDURE — 88304 TISSUE EXAM BY PATHOLOGIST: CPT | Mod: 26

## 2021-01-19 PROCEDURE — 88304 TISSUE EXAM BY PATHOLOGIST: CPT

## 2021-01-19 PROCEDURE — C1713: CPT

## 2021-01-19 PROCEDURE — 88342 IMHCHEM/IMCYTCHM 1ST ANTB: CPT | Mod: 26

## 2021-01-19 PROCEDURE — 22515 PERQ VERTEBRAL AUGMENTATION: CPT

## 2021-01-19 RX ORDER — OXYCODONE HYDROCHLORIDE 5 MG/1
5 TABLET ORAL ONCE
Refills: 0 | Status: DISCONTINUED | OUTPATIENT
Start: 2021-01-19 | End: 2021-01-19

## 2021-01-19 RX ORDER — HYDROMORPHONE HYDROCHLORIDE 2 MG/ML
0.5 INJECTION INTRAMUSCULAR; INTRAVENOUS; SUBCUTANEOUS
Refills: 0 | Status: DISCONTINUED | OUTPATIENT
Start: 2021-01-19 | End: 2021-01-20

## 2021-01-19 RX ORDER — CEFAZOLIN SODIUM 1 G
2000 VIAL (EA) INJECTION ONCE
Refills: 0 | Status: COMPLETED | OUTPATIENT
Start: 2021-01-19 | End: 2021-01-19

## 2021-01-19 RX ORDER — ONDANSETRON 8 MG/1
4 TABLET, FILM COATED ORAL ONCE
Refills: 0 | Status: DISCONTINUED | OUTPATIENT
Start: 2021-01-19 | End: 2021-01-20

## 2021-01-19 RX ORDER — SODIUM CHLORIDE 9 MG/ML
1000 INJECTION, SOLUTION INTRAVENOUS
Refills: 0 | Status: DISCONTINUED | OUTPATIENT
Start: 2021-01-19 | End: 2021-01-20

## 2021-01-19 RX ORDER — TRAMADOL HYDROCHLORIDE 50 MG/1
1 TABLET ORAL
Qty: 0 | Refills: 0 | DISCHARGE

## 2021-01-19 RX ADMIN — OXYCODONE HYDROCHLORIDE 5 MILLIGRAM(S): 5 TABLET ORAL at 18:12

## 2021-01-19 RX ADMIN — SODIUM CHLORIDE 75 MILLILITER(S): 9 INJECTION, SOLUTION INTRAVENOUS at 17:07

## 2021-01-19 NOTE — ASU DISCHARGE PLAN (ADULT/PEDIATRIC) - CARE PROVIDER_API CALL
Bill Espana (MD)  Orthopaedic Surgery  [address], [address]  [place] [address]  Phone: [phone]  Fax: [phone]  Follow Up Time:

## 2021-01-19 NOTE — ASU DISCHARGE PLAN (ADULT/PEDIATRIC) - CALL YOUR DOCTOR IF YOU HAVE ANY OF THE FOLLOWING:
Bleeding that does not stop/Swelling that gets worse/Pain not relieved by Medications/Fever greater than (need to indicate Fahrenheit or Celsius)/Wound/Surgical Site with redness, or foul smelling discharge or pus/Numbness, tingling, color or temperature change to extremity Bleeding that does not stop/Swelling that gets worse/Pain not relieved by Medications/Fever greater than (need to indicate Fahrenheit or Celsius)/Wound/Surgical Site with redness, or foul smelling discharge or pus/Numbness, tingling, color or temperature change to extremity/Nausea and vomiting that does not stop/Inability to tolerate liquids or foods/Increased irritability or sluggishness

## 2021-01-19 NOTE — PHYSICAL THERAPY INITIAL EVALUATION ADULT - ADDITIONAL COMMENTS
Pt lives with wife in a house . Pt can enter through the garage and there are 6+6 steps inside with rail.  Pt has rolling walker

## 2021-01-19 NOTE — BRIEF OPERATIVE NOTE - NSICDXBRIEFPREOP_GEN_ALL_CORE_FT
PRE-OP DIAGNOSIS:  Closed compression fracture of thoracolumbar vertebra 19-Jan-2021 16:50:39 T12 and L3 Priscila Wu

## 2021-01-19 NOTE — BRIEF OPERATIVE NOTE - NSICDXBRIEFPOSTOP_GEN_ALL_CORE_FT
POST-OP DIAGNOSIS:  Closed compression fracture of thoracolumbar vertebra 19-Jan-2021 16:50:46 T12 and L3 Priscila Wu

## 2021-01-19 NOTE — ASU DISCHARGE PLAN (ADULT/PEDIATRIC) - ASU DC SPECIAL INSTRUCTIONSFT
ice 20 minutes on alternating with 20 minutes off for 48 hours post op  No heavy lifting.  Avoid twisting and bending over at waist.  Change dressing if it becomes loose or soiled.  Observe low back precautions as described by the Physical Therapist.  Walking is your best exercise.  Call the doctor with questions, fevers, severe headache, new numbness/weakness or new pain not relieved by medication.  Call office for follow up appointment 7-14 days postop

## 2021-01-20 ENCOUNTER — NON-APPOINTMENT (OUTPATIENT)
Age: 61
End: 2021-01-20

## 2021-01-20 LAB
M TB IFN-G BLD-IMP: ABNORMAL
QUANTIFERON TB PLUS MITOGEN MINUS NIL: 0.03 IU/ML
QUANTIFERON TB PLUS NIL: 0.01 IU/ML
QUANTIFERON TB PLUS TB1 MINUS NIL: 0 IU/ML
QUANTIFERON TB PLUS TB2 MINUS NIL: 0.01 IU/ML

## 2021-01-20 NOTE — ED ADULT TRIAGE NOTE - TEMPERATURE IN FAHRENHEIT (DEGREES F)
[facility] ENCOUNTER  URGENT CARE OFFICE VISIT    SUBJECTIVE:  Mackenzie Cruz is a 53 year old male with a history of anxiety, depression and recurrent pancreatitis who presents for headache.  Started with a headache yesterday.  Took Tylenol and oxycodone and HA improved.  Was okay after that but started having headache a few hours ago and he took a dose of Tylenol and oxycodone and feels better now.  The headache is located in the frontal region, worse on the left side.  He denies photophobia, phonophobia, vision changes.  Some nausea with the headache.  Denies any history of chronic headaches or migraines.  No recent injury or trauma to the head.  No numbness, weakness, speech changes.  No URI symptoms, fevers, chills.  He states that he has been sleeping okay and his p.o. intake has been normal.  However he does endorse some increased stress recently.  He is currently not working.  Lives at home with his wife and son and everybody is feeling well.  No known exposure to anybody with COVID-19 but he would like to get tested today.  He had a negative COVID-19 test on 12/09/2020 and again on 01/08/2021.    REVIEW OF SYSTEMS:    As noted above    Problem list, current medications, past medical / surgical / family and social history reviewed as available in Epic.      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 Omid Franco. Sessile serrated polyp   • Esophagogastroduodenoscopy w/endoscopic us eso stomach duod  02/05/15    Heterogenous appearance to pancreas. Dr. Jaimes.    • Removal gallbladder     • Service to gastroenterology  2005    EUS/ERCP/[facility], normal pancreatic pressures       ALLERGIES:   Allergen Reactions   • Reglan Other (See Comments)     Restlessness, \"I feel like crazy\"       Current Outpatient Medications   Medication Sig Dispense Refill   • oxyCODONE-acetaminophen (Percocet) 5-325 MG per tablet Take 1 tablet by mouth every 6 hours as needed for Pain. 28 tablet 0   • tiZANidine (ZANAFLEX) 2 MG tablet Take 1 tablet by mouth every 8 hours as needed for Muscle spasms. 30 tablet 0   • amitriptyline (ELAVIL) 25 MG tablet Take 1 tablet by mouth nightly. At bedtime. 30 tablet 5   • gabapentin (NEURONTIN) 300 MG capsule Take 1 capsule by mouth 3 times daily. 90 capsule 5   • sertraline (ZOLOFT) 50 MG tablet Take 1 tablet by mouth daily. 30 tablet 5   • Pancrelipase, Lip-Prot-Amyl, 25213-38238 units Cap DR Particles Take 3 capsules by mouth 3 times daily (before meals). 270 capsule 5   • acetaminophen (TYLENOL) 500 MG tablet Take 500 mg by mouth every 6 hours as needed for Pain.     • Naproxen Sod-Diphenhydramine (ALEVE PM) 220-25 MG Tab Take 1 tablet by mouth at bedtime as needed.     • meloxicam (MOBIC) 15 MG tablet Take 1 tablet by mouth daily for 10 days. Take with food, stop if causes stomach upset 10 tablet 0     No current facility-administered medications for this visit.           OBJECTIVE:  PHYSICAL EXAM:      Vitals:    01/19/21 1946   BP: 138/77   Pulse: 76   Resp: 16   Temp: 98.4 °F (36.9 °C)   TempSrc: Oral   SpO2: 97%   Weight: 93 kg   Height: 5' 10\" (1.778 m)          CONSTITUTIONAL:  Well-appearing, in no acute distress.   HEENT:   Eyes: both conjunctiva clear; both PERRLA  External ears are normal.  TMs are clear bilaterally.  Nasal mucosa normal. Turbinates are normal.   Oral mucosa moist.  Posterior oropharynx is normal, no erythema / exudates.    NEUROLOGIC: Alert and oriented x3.  CN 2-12 grossly intact.  UE and LE strength 5/5.    PSYCHIATRIC:  Normal mood and affect.  Memory normal.    DIAGNOSTICS:  Walk In on 01/19/2021   Component Date Value Ref Range Status   • POCT SARS-COV-2 ANTIGEN 01/19/2021 Not Detected  Not Detected Final        Reviewed and analyzed the testing performed in this encounter.     ASSESSMENT AND PLAN:  1. Tension-type headache, not intractable, unspecified chronicity pattern    2. Person under investigation for COVID-19      Mackenzie Cruz is a 53 year old male with a history of anxiety, depression and recurrent pancreatitis who presents for headache x yesterday.  The headache has been on and off and responding to Tylenol and oxycodone.  He is currently headache free.    We discussed differentials for his headache including tension HA, migraine variant, sinus HA, blood pressure related, intracranial etiology vs other.  He does not have any URI symptoms and his HEENT exam today is within normal limits.  His blood pressure today is also within normal limits.  No focal neurological deficits noted on exam today.  Based on history and exam, suspect that this is more of a tension type headache.  Low suspicion for migraine headache.  Patient was concerned about COVID-19 today so a rapid COVID-19 antigen test was done and was negative.  I do not feel a PCR test is indicated at this time as overall very low suspicion for COVID-19 infection given patient's history and symptoms.  He was recommended to continue supportive measures at home.  He was advised to return to Urgent Care if he develops any URI symptoms, fevers or cough.  At that point a COVID-19 PCR test could be  obtained.  Red flag symptoms discussed about when to go to the ER.  Rest of the plan as noted below.    Patient Instructions   Push fluids and stay hydrated.  Make sure you are eating regular meals through the day.  Make sure you are getting 8-10 hours of sleep every night.  Decrease stress.  Tylenol/ibuprofen as needed for pain.  Use oxycodone sparingly for the headaches.  If headaches persist, then follow up with your primary care doctor to discuss increasing either amitriptyline or gabapentin.  ER if having the worst headache of your life, numbness or weakness in your arms or legs, vision loss or double vision, or if any other symptoms worsen.    Orders Placed This Encounter   • POCT SARS-COV-2 ANTIGEN     Order Specific Question:   Is the patient symptomatic per CDC?     Answer:   Yes     Order Specific Question:   Days since symptom onset?     Answer:   5 or less       The patient was advised to follow up with primary physician or to recheck with the urgent care clinic / ER sooner if symptoms get worse.     The patient indicated understanding of the diagnosis and agreed with the plan of care.     97.5

## 2021-01-21 PROCEDURE — G0463: CPT

## 2021-01-27 ENCOUNTER — OUTPATIENT (OUTPATIENT)
Dept: OUTPATIENT SERVICES | Facility: HOSPITAL | Age: 61
LOS: 1 days | End: 2021-01-27
Payer: COMMERCIAL

## 2021-01-27 ENCOUNTER — APPOINTMENT (OUTPATIENT)
Dept: ORTHOPEDIC SURGERY | Facility: CLINIC | Age: 61
End: 2021-01-27
Payer: COMMERCIAL

## 2021-01-27 ENCOUNTER — APPOINTMENT (OUTPATIENT)
Dept: RADIOLOGY | Facility: CLINIC | Age: 61
End: 2021-01-27
Payer: COMMERCIAL

## 2021-01-27 ENCOUNTER — RESULT REVIEW (OUTPATIENT)
Age: 61
End: 2021-01-27

## 2021-01-27 VITALS
HEIGHT: 64 IN | SYSTOLIC BLOOD PRESSURE: 129 MMHG | HEART RATE: 81 BPM | DIASTOLIC BLOOD PRESSURE: 76 MMHG | BODY MASS INDEX: 29.02 KG/M2 | WEIGHT: 170 LBS

## 2021-01-27 DIAGNOSIS — M48.50XA COLLAPSED VERTEBRA, NOT ELSEWHERE CLASSIFIED, SITE UNSPECIFIED, INITIAL ENCOUNTER FOR FRACTURE: ICD-10-CM

## 2021-01-27 DIAGNOSIS — S22.080A WEDGE COMPRESSION FRACTURE OF T11-T12 VERTEBRA, INITIAL ENCOUNTER FOR CLOSED FRACTURE: ICD-10-CM

## 2021-01-27 DIAGNOSIS — Z98.890 OTHER SPECIFIED POSTPROCEDURAL STATES: Chronic | ICD-10-CM

## 2021-01-27 DIAGNOSIS — Z95.1 PRESENCE OF AORTOCORONARY BYPASS GRAFT: Chronic | ICD-10-CM

## 2021-01-27 DIAGNOSIS — S22.000S WEDGE COMPRESSION FRACTURE OF UNSPECIFIED THORACIC VERTEBRA, SEQUELA: ICD-10-CM

## 2021-01-27 DIAGNOSIS — M54.16 RADICULOPATHY, LUMBAR REGION: ICD-10-CM

## 2021-01-27 DIAGNOSIS — R07.81 PLEURODYNIA: ICD-10-CM

## 2021-01-27 DIAGNOSIS — Z94.4 LIVER TRANSPLANT STATUS: Chronic | ICD-10-CM

## 2021-01-27 PROCEDURE — 99072 ADDL SUPL MATRL&STAF TM PHE: CPT

## 2021-01-27 PROCEDURE — 71100 X-RAY EXAM RIBS UNI 2 VIEWS: CPT | Mod: 26,LT

## 2021-01-27 PROCEDURE — 71100 X-RAY EXAM RIBS UNI 2 VIEWS: CPT

## 2021-01-27 PROCEDURE — 99213 OFFICE O/P EST LOW 20 MIN: CPT | Mod: 24

## 2021-01-27 PROCEDURE — 72100 X-RAY EXAM L-S SPINE 2/3 VWS: CPT

## 2021-01-27 RX ORDER — PEN NEEDLE, DIABETIC 32GX 5/32"
32G X 4 MM NEEDLE, DISPOSABLE MISCELLANEOUS
Qty: 90 | Refills: 1 | Status: ACTIVE | COMMUNITY
Start: 2021-01-27 | End: 1900-01-01

## 2021-01-27 NOTE — HISTORY OF PRESENT ILLNESS
[___ Days Post Op] : post op day #[unfilled] [7] : the patient reports pain that is 7/10 in severity [Clean/Dry/Intact] : clean, dry and intact [Erythema] : not erythematous [Discharge] : absent of discharge [Swelling] : not swollen [Dehiscence] : not dehisced [Vascular Intact] : ~T peripheral vascular exam normal [Negative Ayaka's] : maneuvers demonstrated a negative Yaaka's sign [Doing Well] : is doing well [No Sign of Infection] : is showing no signs of infection [Steri-Strips Removed & Replaced] : steri-strips removed and replaced [Adequate Pain Control] : has adequate pain control [Limited ADLs] : to participate in activities of daily living with limitations [No Work] : not to work [No Housework] : not to do housework [No Sports] : not to participate in sports [de-identified] : Patient is here today for his first post operative office visit overall a little less pain since surgery getting left rib pain and hurting with change of positions.Wearing TLSO brace [de-identified] : Kyphoplasty 1/19/21 [de-identified] : Seen with his wife.  Seen with a TLSO.  Grossly intact light touch sensation in her bilateral lower extremities as well as strength testing 5 out of 5 EHL hip flexion knee extension ankle dorsiflexion plantarflexion.  Tenderness over the anterior mid ribs on the left side. [de-identified] : AP and lateral image of the thoracolumbar spine obtained today were compared with x-rays obtained previously.  Cement is seen in the T12 and L3 vertebral body which is new compared to prior x-rays.  Improvement of vertebral height at T12 is noted.  Stable segmental kyphosis from T11-L1 is noted.  Interval appearance of L2 vertebral body with superior plate compression is seen.  No additional fractures noted.  Calcification abdominal aorta.  Vascular clips in the abdomen.  Partially visualized sternal wire. [de-identified] : The patient appears to have an additional fracture of the L2 vertebral body following his T12 and L3 kyphoplasty.  Given his multiple fractures recently I recommended he discuss his presentation again with the endocrinologist and consider use of teriparatide as an option instead of Prolia.  Recommended use of a lumbosacral orthosis which was prescribed today.  He may find that more comfortable than the TLSO for his L2 fracture.  Rib fracture series x-rays were also ordered for him for the left side pain that he is complaining of.  Will renew oxycodone for pain management but a referral to pain management for consideration of medical marijuana and other medication options were also considered.\par \par The patient was educated regarding their condition, treatment options as well as prescribed course of treatment. \par Risks and benefits as well as alternatives to the proposed treatment were also provided to the patient \par They were given the opportunity to have all their questions answered to their satisfaction.\par \par Vital signs were reviewed with the patient and the patient was instructed to followup with their primary care provider for further management.\par \par Healthy lifestyle recommendations were also made including a tobacco free lifestyle, proper diet, and weight control.

## 2021-01-29 ENCOUNTER — APPOINTMENT (OUTPATIENT)
Dept: ENDOCRINOLOGY | Facility: CLINIC | Age: 61
End: 2021-01-29

## 2021-02-04 RX ORDER — DENOSUMAB 60 MG/ML
60 INJECTION SUBCUTANEOUS
Qty: 1 | Refills: 1 | Status: COMPLETED | COMMUNITY
Start: 2021-01-22 | End: 2021-02-04

## 2021-02-09 ENCOUNTER — NON-APPOINTMENT (OUTPATIENT)
Age: 61
End: 2021-02-09

## 2021-02-10 ENCOUNTER — APPOINTMENT (OUTPATIENT)
Dept: ORTHOPEDIC SURGERY | Facility: CLINIC | Age: 61
End: 2021-02-10
Payer: COMMERCIAL

## 2021-02-10 ENCOUNTER — APPOINTMENT (OUTPATIENT)
Dept: INTERNAL MEDICINE | Facility: CLINIC | Age: 61
End: 2021-02-10
Payer: COMMERCIAL

## 2021-02-10 VITALS
HEIGHT: 64 IN | WEIGHT: 168 LBS | HEART RATE: 87 BPM | BODY MASS INDEX: 28.68 KG/M2 | DIASTOLIC BLOOD PRESSURE: 82 MMHG | OXYGEN SATURATION: 96 % | RESPIRATION RATE: 14 BRPM | TEMPERATURE: 98.2 F | SYSTOLIC BLOOD PRESSURE: 142 MMHG

## 2021-02-10 VITALS
HEART RATE: 76 BPM | TEMPERATURE: 97.2 F | SYSTOLIC BLOOD PRESSURE: 125 MMHG | HEIGHT: 64 IN | DIASTOLIC BLOOD PRESSURE: 79 MMHG

## 2021-02-10 PROCEDURE — 99072 ADDL SUPL MATRL&STAF TM PHE: CPT

## 2021-02-10 PROCEDURE — 99214 OFFICE O/P EST MOD 30 MIN: CPT

## 2021-02-10 PROCEDURE — 72100 X-RAY EXAM L-S SPINE 2/3 VWS: CPT

## 2021-02-10 PROCEDURE — 99214 OFFICE O/P EST MOD 30 MIN: CPT | Mod: 24

## 2021-02-10 RX ORDER — TRAMADOL HYDROCHLORIDE 50 MG/1
50 TABLET, COATED ORAL
Qty: 120 | Refills: 0 | Status: ACTIVE | COMMUNITY
Start: 1900-01-01 | End: 1900-01-01

## 2021-02-10 RX ORDER — MYCOPHENOLATE MOFETIL 500 MG/1
500 TABLET ORAL TWICE DAILY
Qty: 120 | Refills: 0 | Status: DISCONTINUED | COMMUNITY
End: 2021-02-10

## 2021-02-10 RX ORDER — DAPSONE 100 MG/1
100 TABLET ORAL DAILY
Qty: 30 | Refills: 0 | Status: DISCONTINUED | COMMUNITY
Start: 2021-01-15 | End: 2021-02-10

## 2021-02-10 NOTE — REVIEW OF SYSTEMS
[Negative] : Heme/Lymph [Back Pain] : back pain [Headache] : headache [Lower Ext Edema] : lower extremity edema [Constipation] : constipation [Diarrhea] : diarrhea [FreeTextEntry9] : tremors; hip pain [de-identified] : diffuse tingling sensation

## 2021-02-10 NOTE — ADDENDUM
[FreeTextEntry1] : I, Janes Shah, acted solely as scribe for Dr. Portillo Mckeon DO on this date 02/10/2021  1:20PM .\par \par All medical record entries made by the Scribe were at my, Dr. Portillo Mckeon DO direction and personally dictated by me on 02/10/2021  1:20PM. I have reviewed the chart and agree that the record accurately reflects my personal performance of the history, physical exam, assessment and plan. I have also personally directed, reviewed and agreed with the chart.\par

## 2021-02-10 NOTE — HISTORY OF PRESENT ILLNESS
[FreeTextEntry1] : Rx refill and general follow-up. [de-identified] : Patient is a 61 year old male with a past medical history as below who presents for Rx refill and general follow-up. Patient states he is taking all medications as prescribed. He notes a severe headache, diffuse tingling sensation, and hip pain after subcutaneous Tymlos injections. He states symptoms typically last for about 1 hour. Patient is s/p kyphoplasty (secondary to L3/T12 fracture) on 1/19/21 with orthopedic surgeon, Dr. Espana, currently using a brace. Per Dr. Espana, "cement is seen in the vertebral body at L3 as well as T12 levels. Previously noted L2 compression fracture was again seen with superior endplate compression deformity and loss of approximately 25% centrally which is unchanged from prior X-rays. There is a questionable superior plate irregularity of the L4 level. Compression deformity of T11 with loss of vertebral body height anteriorly was recently found." Dr. Espana advised the patient to stop PT 2 weeks ago. Patient continues to note post-operative pain. He was previously advised to follow up with a pain management physician. He is currently using a walker to ambulate. Patient is s/p liver transplantation in November 2020. He continues to follow up with hepatologist, Dr. Dinh. Patient states another MRI of the liver will be scheduled as recent blood work revealed elevated bilirubin and ALK PHOS. ERCP is also being considered. Patient notes lower extremity edema. He notes alternating episodes of constipation and loose stool. Patient states he has been trying to maintain a low fat/low sodium diet. Patient requests Rx refill for Tramadol which is helping with post-operative pain. He states he has also been taking Oxycodone, Tylenol, and Cyclobenzaprine as needed.

## 2021-02-10 NOTE — ASSESSMENT
[FreeTextEntry1] : Patient is a 61 year old male with a past medical history as above who presents for Rx refill and general follow-up.

## 2021-02-10 NOTE — PLAN
[FreeTextEntry1] : Musculoskeletal\par L3/T12 fracture - s/p kyphoplasty - continue Tramadol HCl 50mg QID p.o. p.r.n. as directed, Rx filled,  reviewed and scanned into chart - continue to follow up with orthopedic surgeon, Dr. Espana \par Cardiology\par arteriosclerosis of coronary artery - s/p CABG - continue Aspirin 81mg p.o.q.d. - continue to follow up with cardiologist, Dr. Quan\par Vascular\par bilateral lower extremity edema - likely secondary to venous insufficiency/cirrhosis - continue Bumetanide 1mg BID p.o.q.d. - continue using zippered compression stockings - continue keeping legs elevated - continue low sodium diet and weight loss\par Gastroenterology\par cirrhotic liver - s/p transplantation - continue Prednisone 5mg BID p.o.q.d. as directed, Mycophenolate Mofetil 500mg BID p.o.q.d. as directed, Tacrolimus (Prograf) 5mg BID p.o.q.d. as directed, Tacrolimus (Prograf) 1mg BID p.o.q.d. as directed, and Valganciclovir HCl 450mg BID p.o.q.d. as directed - continue to follow up liver specialist, Dr. Dinh; MRI to be scheduled  \par GERD - continue Pantoprazole Sodium 40mg p.o.q.d. - continue antireflux measures\par constipation - continue Colace p.o. as directed \par Endocrinology/Musculoskeletal\par osteoporosis - continue Tymlos 3120MCG/1.56mL subcutaneous injections q.d. as directed - continue to follow up with endocrinologist, Dr. Angel

## 2021-02-10 NOTE — PHYSICAL EXAM
[No Acute Distress] : no acute distress [Well Nourished] : well nourished [Well Developed] : well developed [Well-Appearing] : well-appearing [Normal Sclera/Conjunctiva] : normal sclera/conjunctiva [PERRL] : pupils equal round and reactive to light [EOMI] : extraocular movements intact [Normal Outer Ear/Nose] : the outer ears and nose were normal in appearance [Normal Oropharynx] : the oropharynx was normal [No JVD] : no jugular venous distention [No Lymphadenopathy] : no lymphadenopathy [Supple] : supple [Thyroid Normal, No Nodules] : the thyroid was normal and there were no nodules present [No Respiratory Distress] : no respiratory distress  [No Accessory Muscle Use] : no accessory muscle use [Clear to Auscultation] : lungs were clear to auscultation bilaterally [Normal Rate] : normal rate  [Regular Rhythm] : with a regular rhythm [Normal S1, S2] : normal S1 and S2 [No Murmur] : no murmur heard [No Carotid Bruits] : no carotid bruits [No Abdominal Bruit] : a ~M bruit was not heard ~T in the abdomen [No Varicosities] : no varicosities [Pedal Pulses Present] : the pedal pulses are present [No Palpable Aorta] : no palpable aorta [No Extremity Clubbing/Cyanosis] : no extremity clubbing/cyanosis [Soft] : abdomen soft [Non Tender] : non-tender [Non-distended] : non-distended [No Masses] : no abdominal mass palpated [No HSM] : no HSM [Normal Bowel Sounds] : normal bowel sounds [Normal Posterior Cervical Nodes] : no posterior cervical lymphadenopathy [Normal Anterior Cervical Nodes] : no anterior cervical lymphadenopathy [No CVA Tenderness] : no CVA  tenderness [No Spinal Tenderness] : no spinal tenderness [No Joint Swelling] : no joint swelling [Grossly Normal Strength/Tone] : grossly normal strength/tone [No Rash] : no rash [Coordination Grossly Intact] : coordination grossly intact [No Focal Deficits] : no focal deficits [Normal Gait] : normal gait [Deep Tendon Reflexes (DTR)] : deep tendon reflexes were 2+ and symmetric [Normal Affect] : the affect was normal [Normal Insight/Judgement] : insight and judgment were intact [de-identified] : trace-1+ edema in lower extremities bilaterally  [de-identified] : overweight

## 2021-02-24 ENCOUNTER — APPOINTMENT (OUTPATIENT)
Dept: ORTHOPEDIC SURGERY | Facility: CLINIC | Age: 61
End: 2021-02-24
Payer: COMMERCIAL

## 2021-02-24 VITALS
DIASTOLIC BLOOD PRESSURE: 83 MMHG | BODY MASS INDEX: 29.02 KG/M2 | SYSTOLIC BLOOD PRESSURE: 137 MMHG | HEART RATE: 92 BPM | HEIGHT: 64 IN | WEIGHT: 170 LBS | TEMPERATURE: 97.5 F

## 2021-02-24 PROCEDURE — 99072 ADDL SUPL MATRL&STAF TM PHE: CPT

## 2021-02-24 PROCEDURE — 72100 X-RAY EXAM L-S SPINE 2/3 VWS: CPT

## 2021-02-24 PROCEDURE — 72070 X-RAY EXAM THORAC SPINE 2VWS: CPT

## 2021-02-24 PROCEDURE — 99213 OFFICE O/P EST LOW 20 MIN: CPT

## 2021-02-25 NOTE — HISTORY OF PRESENT ILLNESS
[___ Weeks Post Op] : [unfilled] weeks post op [8] : the patient reports pain that is 8/10 in severity [Healed] : healed [Vascular Intact] : ~T peripheral vascular exam normal [Negative Ayaka's] : maneuvers demonstrated a negative Ayaka's sign [Slow Progress] : is progressing slowly [No Sign of Infection] : is showing no signs of infection [Adequate Pain Control] : has adequate pain control [de-identified] : Kyphoplasty 1/19/21 [de-identified] : Patient is having issue with pain control cannot get appointment with pain management until March 2021.\par Has has back pain chronically. Woke up this morning with pain. Takes tylenol and tramadol\par On Tymmlos for osteoporosis for ~3 weeks now.\par Primarily left lower back pain\par Still on prednisone 7.5 mg, no biliary duct obstruction per pt who had MRI [de-identified] : seen with his wife\par walker\par TLSO\par healed kyphoplasty incisions\par Flexes to his mid thigh and extend 30 degrees with limited discomfort today. [de-identified] : AP and lateral image of the thoracic spine demonstrate minimal left-sided upper thoracic and right-sided midthoracic curve.  Sternotomy wires noted.  Cement is seen in the vertebral body at T12 is partially visualized.  On the lateral projection significant thoracic kyphosis with apex at the level of T11.  Previously noted T11 superior plate compression fracture is again seen with no significant interval change appreciated with approximately 3% loss of vertebral body height anteriorly.  Cement is seen in the vertebral body at T12 as well as L3.  Diffuse osteopenia.\par \par AP and lateral image of the lumbar spine demonstrates diffuse osteopenia.  No significant scoliosis.  Cement in the vertebral body at T12 and L3.  Calcification abdominal aorta.  Previously noted L2 vertebral body superior plate compression again seen without significant interval change in approximately 20% loss of vertebral body height.  Superior plate compression with sclerosis also seen at L5 which is unchanged with possible increased sclerosis. [de-identified] : The patient reports persistent back pain which is stabilized.  Radiograph leads are no new fractures noted and previously noted fracture that T11 and L2 appear stable at this time.  We are assuming that these fractures started approximately the beginning of February and will expect 3 months of healing time.  Given the stable findings I would advise him to avoid cement augmentation.  He can be seen for follow-up in 3 to 4 weeks for repeat x-rays AP lateral thoracic and lumbar spine.  He will continue follow-up with his liver transplant team for medications related to that.  For now advised against physical therapy till reevaluation.  He can continue ambulation with assist devices as tolerated.\par \par The patient was educated regarding their condition, treatment options as well as prescribed course of treatment. \par Risks and benefits as well as alternatives to the proposed treatment were also provided to the patient \par They were given the opportunity to have all their questions answered to their satisfaction.\par \par Vital signs were reviewed with the patient and the patient was instructed to followup with their primary care provider for further management.\par \par Healthy lifestyle recommendations were also made including a tobacco free lifestyle, proper diet, and weight control.

## 2021-03-09 ENCOUNTER — APPOINTMENT (OUTPATIENT)
Dept: ENDOCRINOLOGY | Facility: CLINIC | Age: 61
End: 2021-03-09
Payer: COMMERCIAL

## 2021-03-09 ENCOUNTER — LABORATORY RESULT (OUTPATIENT)
Age: 61
End: 2021-03-09

## 2021-03-09 VITALS
SYSTOLIC BLOOD PRESSURE: 140 MMHG | BODY MASS INDEX: 29.02 KG/M2 | HEIGHT: 64 IN | HEART RATE: 76 BPM | TEMPERATURE: 98.1 F | WEIGHT: 170 LBS | OXYGEN SATURATION: 98 % | DIASTOLIC BLOOD PRESSURE: 80 MMHG

## 2021-03-09 PROCEDURE — 99215 OFFICE O/P EST HI 40 MIN: CPT

## 2021-03-09 PROCEDURE — 99072 ADDL SUPL MATRL&STAF TM PHE: CPT

## 2021-03-09 RX ORDER — BUMETANIDE 1 MG/1
1 TABLET ORAL
Refills: 0 | Status: DISCONTINUED | COMMUNITY
End: 2021-03-09

## 2021-03-09 RX ORDER — OXYCODONE 5 MG/1
5 TABLET ORAL
Qty: 28 | Refills: 0 | Status: DISCONTINUED | COMMUNITY
Start: 2020-12-08 | End: 2021-03-09

## 2021-03-09 NOTE — ASSESSMENT
[Teriparatide/Abaloparatide Therapy] : Risks and benefits of Teriparatide/Abaloparatide therapy were discussed with the patient including potential risk of osteosarcoma [Other____] : [unfilled] [FreeTextEntry1] : Complicated 61 year-old male presents with progressive osteoporosis and fractures. Bone mineral density 2019 was moderately low but patient did not begin medical therapy for osteoporosis. The patient does have a previous history of apparently calcium oxalate kidney stones.\par \par Medical history is notable for recent liver transplantation for cryptogenic cirrhosis. Patient is on steroids as part of transplant treatment. He has new L3 and severe T12 compression fractures with fairly severe pain. BMD 1/2021 shows progressive worsening osteoporosis spine and hip versus outside study 2019 as well as severely low proximal radius. The patient is at extremely high risk for future fractures. Options of medical therapy were discussed in great detail with patient and spouse. \par Pt had kyphoplasty 1/2021 w/ some relief in pain. Pt started Tymlos 1/2021. Taken correctly. Reports aches and pains for half hour after injection. No blood Ca elevation, lightheadedness, palpitations, or dizziness.\par \par Options of therapy reviewed in great detail. Pt reports he has one Tymlos pen left and that insurance will not approve more pens. However pt insurance does approve Forteo. Pt is willing to try Forteo. I recommend pt finish remaining Tymlos, then start Forteo and see if he feels better. If pt does not tolerate Forteo, then pt can transition to Prolia. Risks and benefits discussed. All questions were answered. Prescription sent.\par \par Request labs sent out.\par \par F/u in 3 months

## 2021-03-09 NOTE — HISTORY OF PRESENT ILLNESS
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  Previously patient had history of coronary artery disease status post bypass surgery December 2019. Long history of kidney stones last passed 2019. \par The patient recently developed severe back pain has been found to have T12 and L3 compression fractures. The patient has major persistent pain despite wearing a brace.Underwent g kyphoplasty with only.moderate pain relief.  Also complaining of intermittent but severe back spasm continues to wear back brace but does have increased mobility such as ability to get out of bed without assistance.\par Pt started Tymlos 1/2021. Taken correctly. Reports aches and pains for half hour after injection. No blood Ca elevation, lightheadedness, palpitations, or dizziness\par The patient has no previous history of significant bone disease. He did have previous metatarsal fractures and a traumatic radius fracture. The patient has been on prednisone as part of the transplant therapy since November 2020 now decreased to 5 mg. Bone mineral density September 2019 was moderately low. Spine -2.7 total hip -2.3 femoral neck -2.7. The patient did not begin medical osteoporosis therapy at that time. Pt had kyphoplasty 1/2021 w/ some relief in pain. BMD 1/2021 shows progressive worsening osteoporosis spine and hip versus outside study 2019 as well as severely low proximal radius. \par . \par Pt c/o acute muscle spasms/pain, takes Tramadol w/ some relief.

## 2021-03-09 NOTE — END OF VISIT
[FreeTextEntry3] : I, Bogdan Turcios, authored this note working as a medical scribe for Dr. Angel.  03/09/2021.  2:00PM. This note was authored by the medical scribe for me. I have reviewed, edited, and revised the note as needed. I am in agreement with the exam findings, imaging findings, and treatment plan.  Maykel Angel MD

## 2021-03-09 NOTE — PHYSICAL EXAM
[Alert] : alert [Well Nourished] : well nourished [Normal Sclera/Conjunctiva] : normal sclera/conjunctiva [EOMI] : extra ocular movement intact [No Proptosis] : no proptosis [Thyroid Not Enlarged] : the thyroid was not enlarged [No Thyroid Nodules] : no palpable thyroid nodules [Clear to Auscultation] : lungs were clear to auscultation bilaterally [Normal S1, S2] : normal S1 and S2 [Normal Rate] : heart rate was normal [Regular Rhythm] : with a regular rhythm [No Edema] : no peripheral edema [Normal Bowel Sounds] : normal bowel sounds [Not Tender] : non-tender [Not Distended] : not distended [Soft] : abdomen soft [Normal Anterior Cervical Nodes] : no anterior cervical lymphadenopathy [No Spinal Tenderness] : no spinal tenderness [Kyphosis] : kyphosis present [Scoliosis] : scoliosis present [No Stigmata of Cushings Syndrome] : no stigmata of Cushings Syndrome [Normal Reflexes] : deep tendon reflexes were 2+ and symmetric [Oriented x3] : oriented to person, place, and time [Normal Strength/Tone] : muscle strength and tone were normal [de-identified] : appears uncomfortable.  [de-identified] : anicteric [de-identified] : wearing back brace. Severe kyphoisis, examined without brace [de-identified] : ambulates w/ walker in public, uses cane alone at home

## 2021-03-13 ENCOUNTER — OUTPATIENT (OUTPATIENT)
Dept: OUTPATIENT SERVICES | Facility: HOSPITAL | Age: 61
LOS: 1 days | End: 2021-03-13
Payer: COMMERCIAL

## 2021-03-13 ENCOUNTER — APPOINTMENT (OUTPATIENT)
Dept: RADIOLOGY | Facility: CLINIC | Age: 61
End: 2021-03-13
Payer: COMMERCIAL

## 2021-03-13 DIAGNOSIS — Z94.4 LIVER TRANSPLANT STATUS: ICD-10-CM

## 2021-03-13 DIAGNOSIS — Z94.4 LIVER TRANSPLANT STATUS: Chronic | ICD-10-CM

## 2021-03-13 DIAGNOSIS — K59.00 CONSTIPATION, UNSPECIFIED: ICD-10-CM

## 2021-03-13 DIAGNOSIS — R10.9 UNSPECIFIED ABDOMINAL PAIN: ICD-10-CM

## 2021-03-13 DIAGNOSIS — Z95.1 PRESENCE OF AORTOCORONARY BYPASS GRAFT: Chronic | ICD-10-CM

## 2021-03-13 DIAGNOSIS — Z98.890 OTHER SPECIFIED POSTPROCEDURAL STATES: Chronic | ICD-10-CM

## 2021-03-13 PROCEDURE — 74019 RADEX ABDOMEN 2 VIEWS: CPT

## 2021-03-13 PROCEDURE — 74019 RADEX ABDOMEN 2 VIEWS: CPT | Mod: 26

## 2021-03-16 LAB
ALBUMIN SERPL ELPH-MCNC: 4.1 G/DL
ALP BLD-CCNC: 140 U/L
ALP BONE SERPL-MCNC: 22.1 UG/L
ALT SERPL-CCNC: 12 U/L
ANION GAP SERPL CALC-SCNC: 16 MMOL/L
AST SERPL-CCNC: 28 U/L
BILIRUB SERPL-MCNC: 1.5 MG/DL
BUN SERPL-MCNC: 28 MG/DL
CALCIUM SERPL-MCNC: 9.4 MG/DL
CHLORIDE SERPL-SCNC: 101 MMOL/L
CO2 SERPL-SCNC: 20 MMOL/L
CREAT SERPL-MCNC: 1.14 MG/DL
GLUCOSE SERPL-MCNC: 148 MG/DL
POTASSIUM SERPL-SCNC: 5.5 MMOL/L
PROT SERPL-MCNC: 6.3 G/DL
SODIUM SERPL-SCNC: 138 MMOL/L

## 2021-03-24 ENCOUNTER — APPOINTMENT (OUTPATIENT)
Dept: ORTHOPEDIC SURGERY | Facility: CLINIC | Age: 61
End: 2021-03-24
Payer: COMMERCIAL

## 2021-03-24 VITALS
DIASTOLIC BLOOD PRESSURE: 84 MMHG | WEIGHT: 160 LBS | BODY MASS INDEX: 27.31 KG/M2 | SYSTOLIC BLOOD PRESSURE: 128 MMHG | HEIGHT: 64 IN | HEART RATE: 101 BPM | TEMPERATURE: 97.5 F

## 2021-03-24 PROCEDURE — 72100 X-RAY EXAM L-S SPINE 2/3 VWS: CPT

## 2021-03-24 PROCEDURE — 72070 X-RAY EXAM THORAC SPINE 2VWS: CPT

## 2021-03-24 PROCEDURE — 99213 OFFICE O/P EST LOW 20 MIN: CPT | Mod: 24

## 2021-03-24 PROCEDURE — 99072 ADDL SUPL MATRL&STAF TM PHE: CPT

## 2021-03-25 ENCOUNTER — RX RENEWAL (OUTPATIENT)
Age: 61
End: 2021-03-25

## 2021-03-26 ENCOUNTER — NON-APPOINTMENT (OUTPATIENT)
Age: 61
End: 2021-03-26

## 2021-03-26 ENCOUNTER — TRANSCRIPTION ENCOUNTER (OUTPATIENT)
Age: 61
End: 2021-03-26

## 2021-03-30 ENCOUNTER — NON-APPOINTMENT (OUTPATIENT)
Age: 61
End: 2021-03-30

## 2021-04-01 ENCOUNTER — APPOINTMENT (OUTPATIENT)
Dept: MRI IMAGING | Facility: CLINIC | Age: 61
End: 2021-04-01

## 2021-04-02 ENCOUNTER — APPOINTMENT (OUTPATIENT)
Dept: MRI IMAGING | Facility: CLINIC | Age: 61
End: 2021-04-02
Payer: COMMERCIAL

## 2021-04-02 ENCOUNTER — OUTPATIENT (OUTPATIENT)
Dept: OUTPATIENT SERVICES | Facility: HOSPITAL | Age: 61
LOS: 1 days | End: 2021-04-02

## 2021-04-02 DIAGNOSIS — Z98.890 OTHER SPECIFIED POSTPROCEDURAL STATES: Chronic | ICD-10-CM

## 2021-04-02 DIAGNOSIS — Z95.1 PRESENCE OF AORTOCORONARY BYPASS GRAFT: Chronic | ICD-10-CM

## 2021-04-02 DIAGNOSIS — M54.16 RADICULOPATHY, LUMBAR REGION: ICD-10-CM

## 2021-04-02 DIAGNOSIS — Z94.4 LIVER TRANSPLANT STATUS: Chronic | ICD-10-CM

## 2021-04-02 PROCEDURE — 72146 MRI CHEST SPINE W/O DYE: CPT | Mod: 26

## 2021-04-02 PROCEDURE — 72148 MRI LUMBAR SPINE W/O DYE: CPT | Mod: 26

## 2021-04-05 ENCOUNTER — APPOINTMENT (OUTPATIENT)
Dept: ORTHOPEDIC SURGERY | Facility: CLINIC | Age: 61
End: 2021-04-05

## 2021-04-05 ENCOUNTER — NON-APPOINTMENT (OUTPATIENT)
Age: 61
End: 2021-04-05

## 2021-04-05 NOTE — HISTORY OF PRESENT ILLNESS
[___ Months Post Op] : [unfilled] months post op [5] : the patient reports pain that is 5/10 in severity [Doing Well] : is doing well [Excellent Pain Control] : has excellent pain control [No Sign of Infection] : is showing no signs of infection [Limited ADLs] : to participate in activities of daily living with limitations [No Work] : not to work [Limited Housework] : to do housework with limitations [No Sports] : not to participate in sports [de-identified] : Kyphoplasty 1/19/21 [de-identified] : Patient is here today for his 2 month post operative office visit. Overall coming along. Patient still having issues with anything prolong  turning. Using ice resting and tramadol helps with the pain.\par Taking tramadol  mg 1 to 2 tabs prn, long acting flexeril was also prescribed but onot approved by insurance. \par Has seen Dr Garcia for pain management\par Using a cane for walking around the house and 4-5 steps unassissted as well.\par Went for PT on saturday doing legs\par On tymlos for osteoporosis sees Dr Giordano [de-identified] : AP and lateral image of the thoracic spine obtained to compare with previous x-rays.  Out of brace sternotomy wires noted.  Cement is seen in the vertebral body at the T12 level.  Superior plate compression seen on the T11 level which is stable when compared with prior x-rays and is the apex of the kyphotic deformity.  Superior plate irregularity also seen at T10 which is stable.  No obvious acute fractures identified.\par \par AP and lateral image of the lumbar spine demonstrate cement in the vertebral body at T12 and L3.  Superior plate depression seen on the L2 and L1 vertebral bodies which are essentially unchanged from prior.  Normal lumbar lordosis.  Calcification abdominal aorta.  Vascular clips throughout the abdomen.  No obvious acute fractures noted. [de-identified] : walker\par TLSO\par seen with his wife\par healed kyphoplasty incisions\par Flexes to his mid thigh and extend 30 degrees with limited discomfort today. The surgical incision site(s) was healed. Additional findings included peripheral vascular exam normal and maneuvers demonstrated a negative Ayaka's sign.  [de-identified] : At this point the patient appears stable with no interval appearance of fractures.  He is currently on Tymlos for his osteoporosis and is followed by an endocrinologist.  He is also seeing Dr. Garcia for pain management.  Overall he is pleased with the outcome of his care and the no new fractures.  Recommend continue use of a TLSO for additional month.  He can start some physical therapy for his back including ultrasound and TENS unit but no modalities or significant stretching.  I will see him back in 3-4 weeks for final x-rays.  At that time we can discontinue the TLSO if no additional fractures are noted.  He will continue his osteoporosis care with the endocrinologist.\par \par The patient was educated regarding their condition, treatment options as well as prescribed course of treatment. \par Risks and benefits as well as alternatives to the proposed treatment were also provided to the patient \par They were given the opportunity to have all their questions answered to their satisfaction.\par \par Vital signs were reviewed with the patient and the patient was instructed to followup with their primary care provider for further management.\par \par Healthy lifestyle recommendations were also made including a tobacco free lifestyle, proper diet, and weight control.

## 2021-04-08 ENCOUNTER — APPOINTMENT (OUTPATIENT)
Dept: NEUROLOGY | Facility: CLINIC | Age: 61
End: 2021-04-08
Payer: COMMERCIAL

## 2021-04-08 VITALS — TEMPERATURE: 97.3 F

## 2021-04-08 VITALS
SYSTOLIC BLOOD PRESSURE: 122 MMHG | HEIGHT: 68 IN | HEART RATE: 96 BPM | BODY MASS INDEX: 23.49 KG/M2 | WEIGHT: 155 LBS | DIASTOLIC BLOOD PRESSURE: 81 MMHG

## 2021-04-08 DIAGNOSIS — M21.372 FOOT DROP, LEFT FOOT: ICD-10-CM

## 2021-04-08 PROCEDURE — 99072 ADDL SUPL MATRL&STAF TM PHE: CPT

## 2021-04-08 PROCEDURE — 99244 OFF/OP CNSLTJ NEW/EST MOD 40: CPT

## 2021-04-09 NOTE — HISTORY OF PRESENT ILLNESS
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 Espana.\par \par The patient's presenting complaints consist of foot drop since approximately 1 to 2 weeks ago which was acute in onset with numbness and tingling in the anterior lateral compartment of the left leg including the dorsum of his left foot.  Background history reveals that he has chronic low back pain which was nonradicular in nature since December 2020 after liver transplant surgery in November 2020 which was successful and in December 2019 he had three-vessel bypass surgery and history of chronic cirrhosis with splenomegaly which is cryptogenic with a family history of similar cirrhotic liver in the mother and brother.  He was evaluated by an orthopedic surgeon for compression fractures of L3 and T12 vertebral bodies due to severe osteoporosis and required kyphoplasty on January 18, 2021.  He continues to have moderate to severe low back pain and was on steroids as result of his liver transplant and is currently taking 2.5 mg daily.\par \par There are no symptoms in the right leg and bilateral upper extremities and is a nondiabetic and after the foot drop started walking with a walker because of significant low back pain and letter started using quad cane and receiving physical therapy.\par \par His past medical history is delineated in details in the electronic medical records.  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

## 2021-04-09 NOTE — PHYSICAL EXAM
[FreeTextEntry1] : General examination is unremarkable.  Vital signs are stable and there is no carotid bruit, thyromegaly or lymphadenopathy.\par \par Neurological examination reveals profound left foot drop without any motor function in the extensor digitorum longus tibialis anterior extensor hallucis longus peroneus longus and extensor digitorum brevis but his tibialis posterior is normal 5/5 including plantar flexors quadriceps iliopsoas gluteals and hamstrings.  There is decreased perception in the left common peroneal sensory distribution sparing the lateral patellar branch.  Rest of his neurological examination is unremarkable as delineated. [General Appearance - Alert] : alert [Oriented To Time, Place, And Person] : oriented to person, place, and time [General Appearance - In No Acute Distress] : in no acute distress [Impaired Insight] : insight and judgment were intact [Affect] : the affect was normal [Person] : oriented to person [Place] : oriented to place [Time] : oriented to time [Concentration Intact] : normal concentrating ability [Visual Intact] : visual attention was ~T not ~L decreased [Naming Objects] : no difficulty naming common objects [Repeating Phrases] : no difficulty repeating a phrase [Writing A Sentence] : no difficulty writing a sentence [Fluency] : fluency intact [Comprehension] : comprehension intact [Reading] : reading intact [Past History] : adequate knowledge of personal past history [Cranial Nerves Optic (II)] : visual acuity intact bilaterally,  visual fields full to confrontation, pupils equal round and reactive to light [Cranial Nerves Oculomotor (III)] : extraocular motion intact [Cranial Nerves Trigeminal (V)] : facial sensation intact symmetrically [Cranial Nerves Facial (VII)] : face symmetrical [Cranial Nerves Vestibulocochlear (VIII)] : hearing was intact bilaterally [Cranial Nerves Glossopharyngeal (IX)] : tongue and palate midline [Cranial Nerves Accessory (XI - Cranial And Spinal)] : head turning and shoulder shrug symmetric [Cranial Nerves Hypoglossal (XII)] : there was no tongue deviation with protrusion [Motor Tone] : muscle tone was normal in all four extremities [Motor Strength] : muscle strength was normal in all four extremities [No Muscle Atrophy] : normal bulk in all four extremities [Sensation Tactile Decrease] : light touch was intact [Abnormal Walk] : normal gait [Balance] : balance was intact [Past-pointing] : there was no past-pointing [Tremor] : no tremor present [2+] : Ankle jerk left 2+ [Plantar Reflex Right Only] : normal on the right [Plantar Reflex Left Only] : normal on the left [FreeTextEntry6] : He has profound left foot drop [FreeTextEntry7] : There is decreased sensation in the left common peroneal distribution.

## 2021-04-09 NOTE — REVIEW OF SYSTEMS
[Feeling Poorly] : feeling poorly [Leg Weakness] : leg weakness [Numbness] : numbness [Tingling] : tingling [As noted in HPI] : as noted in HPI [As Noted in HPI] : as noted in HPI [Arthralgias] : arthralgias [Joint Pain] : joint pain [Negative] : Heme/Lymph

## 2021-04-09 NOTE — DATA REVIEWED
[de-identified] : I reviewed the neuroimaging studies of the spine confirming compression fractures and kyphoplasty.  There is no evidence of nerve root or spinal cord compression. [de-identified] : There are extensive electronic medical records of his coronary artery surgery, liver transplant and extensive medical history which were reviewed but there is no evidence of diabetes or known vasculitis.

## 2021-04-12 ENCOUNTER — APPOINTMENT (OUTPATIENT)
Dept: ORTHOPEDIC SURGERY | Facility: CLINIC | Age: 61
End: 2021-04-12
Payer: COMMERCIAL

## 2021-04-12 VITALS
DIASTOLIC BLOOD PRESSURE: 86 MMHG | TEMPERATURE: 97.3 F | HEART RATE: 88 BPM | HEIGHT: 68 IN | SYSTOLIC BLOOD PRESSURE: 126 MMHG

## 2021-04-12 PROCEDURE — 99072 ADDL SUPL MATRL&STAF TM PHE: CPT

## 2021-04-12 PROCEDURE — 99214 OFFICE O/P EST MOD 30 MIN: CPT

## 2021-04-12 RX ORDER — CALCIUM CITRATE/VITAMIN D3 315MG-6.25
315-6.25 TABLET ORAL
Qty: 90 | Refills: 0 | Status: ACTIVE | COMMUNITY
Start: 2021-01-20

## 2021-04-12 NOTE — HISTORY OF PRESENT ILLNESS
[___ Months Post Op] : [unfilled] months post op [1] : the patient reports pain that is 1/10 in severity [Healed] : healed [Doing Well] : is doing well [Excellent Pain Control] : has excellent pain control [No Sign of Infection] : is showing no signs of infection [Limited ADLs] : to participate in activities of daily living with limitations [Limited Work] : to work with limitations [No Housework] : not to do housework [No Sports] : not to participate in sports [Erythema] : not erythematous [Discharge] : absent of discharge [Swelling] : not swollen [Dehiscence] : not dehisced [de-identified] : Kyphoplasty 1/19/21 [de-identified] : Patient here today to review mri thoracic and lumbar spine 4/2/21 and to review 's exam. Patient wearing TLSO brace and wants to return to work on Monday 4/19/21.\par Based on neurologic assessment appears to  have peroneal nerve.\par Sees Dr Garcia for pain management, takes tramadol prn, did not take an oxycodone [de-identified] : 1/5 EHL, ankle DF on left, 5/5 knee extension, hip flexion, plantar flexion\par seen with his wife\par seen with a walker\par Seen with a TLSO [de-identified] : EXAM: MR SPINE LUMBAR\par \par EXAM: MR SPINE THORACIC\par \par \par PROCEDURE DATE: 04/02/2021\par \par INTERPRETATION: MR THORACIC SPINE, MR LUMBAR SPINE dated 4/2/2021 2:12 PM\par \par INDICATION: Lower back pain. Compression fractures. Dropped foot. Prior kyphoplasty.\par \par COMPARISON: Lumbar spine MRI dated 12/14/2020\par \par TECHNIQUE: Multi-sequential, multiplanar MRI imaging of the thoracic and lumbosacral spine was performed per standard protocol.\par \par FINDINGS:\par DISC LEVEL EVALUATION:\par \par T1/T2: No central canal or foraminal narrowing.\par T2/T3: No central canal or foraminal narrowing.\par T3/T4: No central canal or foraminal narrowing.\par T4/T5: No central canal or foraminal narrowing.\par T5/T6: No central canal or foraminal narrowing.\par T6/T7: No central canal or foraminal narrowing.\par T7/T8: No central canal or foraminal narrowing.\par T8/T9: Evaluated only in the sagittal plane. Mild disc bulge. No central canal or foraminal narrowing.\par T9/T10: Mild to moderate left and moderate right facet degenerative change. Minimal disc bulging. No central canal or foraminal narrowing.\par T10/T11: Moderate bilateral facet degenerative change. Mild posterior osseous ridging. Mild bilateral foraminal narrowing. No central canal narrowing.\par T11/T12: Mild facet degenerative change. No central canal or foraminal narrowing.\par T12/L1: Mild facet degenerative change. No central canal or foraminal narrowing.\par \par L1/L2: Mild bilateral facet degenerative change. No central canal or foraminal narrowing. Lateral recesses are preserved.\par L2/L3: Posterior osseous ridging and disc bulging mildly effacing the ventral thecal sac. Mild bilateral foraminal narrowing. No central canal stenosis.\par L3/L4: Small right foraminal disc protrusion mild right foraminal narrowing. No central canal or left foraminal narrowing. Lateral recesses are preserved.\par L4/L5: Mild disc bulging. Mild bilateral foraminal narrowing. No central canal narrowing. Mild bilateral lateral recess narrowing.\par L5/S1: Mild disc bulging. Small annular tear in the left paracentral region. No central canal or foraminal narrowing. Lateral recesses are preserved.\par \par SPINAL ALIGNMENT: There is kyphosis centered at the T12 level. Varying levels of disc space narrowing.\par CORD AND CONUS: The spinal cord terminates at the T12-L1 level. No abnormal signal is seen in the conus. There is a focal rounded T2 hyperintensity in the central spinal cord extending from T2 through T5.\par SI JOINTS: No articular abnormality.\par MARROW: There are multiple chronic compression fractures. This includes severe compression deformity with methylmethacrylate in the T12 vertebra and mild to moderate compression deformity superior endplate of T11. There is moderate compression deformity of the L3 vertebra with methylmethacrylate. Mild superior endplate deformity of the L5 vertebra. Minimal deformity of the superior plate of T9 and T10. There is mild edema along the superior T9 and T10 vertebra suggesting acute fractures.\par PARASPINAL MUSCLE AND SOFT TISSUES: Moderate atrophy of the paraspinal musculature. Mild feathery muscular edema within the right lower lumbar paraspinal musculature.\par INTRAABDOMINAL/INTRAPELVIC SOFT TISSUES: Tortuous aorta. Atelectasis at the left lung base with small left pleural effusion.\par \par IMPRESSION:\par \par 1. Chronic compression fractures of T11, T12, L2, L3, and L5. Kyphoplasty is noted at T12 and L3.\par 2. Mild edema along the superior endplates of T9 and T10 suggesting acute fractures. No retropulsion or cord compression.\par 3. Multilevel spondylosis as above.\par 4. Small focus of rounded central spinal cord T2 signal prolongation from T2 through L5, likely a prominent central canal the spinal cord.\par 5. Small atelectasis and pleural effusion at the left lung base.\par \par NANCY KEBEDE MD; Attending Radiologist\par This document has been electronically signed. Apr 2 2021 2:27PM [de-identified] : The patient presents with acute onset of left foot drop approximately 2 weeks ago.  He has seen a neurologist since then and thinks that may be peroneal neuropathy rather than lumbar radiculopathy specially since the MRI of the thoracic and lumbar spine did not reveal any neural compression.  He does have new fracture of the T9 and T10 vertebral body that were not noted previously.  T11 fracture is healed.  In this setting recommended continue use of a TLSO for additional 6 weeks.  Recommended follow-up in 4 weeks for repeat x-rays of the thoracic spine AP and lateral to ensure that there is no progression of his fracture.  If there is any change in pain pattern can be seen prior to that.\par \par For his foot drop recommended use of AFO which he is getting this week.  He will follow up with the neurologist regarding electrodiagnostic studies for this.\par \par He will also follow-up with the endocrinologist for further management of his osteoporosis with Tymlos.\par \par The patient is returning to work on April 19 and I recommended use of the brace when he is at work.\par \par The patient was educated regarding their condition, treatment options as well as prescribed course of treatment. \par Risks and benefits as well as alternatives to the proposed treatment were also provided to the patient \par They were given the opportunity to have all their questions answered to their satisfaction.\par \par Vital signs were reviewed with the patient and the patient was instructed to followup with their primary care provider for further management.\par \par Healthy lifestyle recommendations were also made including a tobacco free lifestyle, proper diet, and weight control.\par \par I spent 30 minutes of face-to-face time with the patient of which over 50% was spent in counseling the discussion above

## 2021-04-23 ENCOUNTER — NON-APPOINTMENT (OUTPATIENT)
Age: 61
End: 2021-04-23

## 2021-05-10 ENCOUNTER — APPOINTMENT (OUTPATIENT)
Dept: ORTHOPEDIC SURGERY | Facility: CLINIC | Age: 61
End: 2021-05-10
Payer: COMMERCIAL

## 2021-05-10 VITALS
WEIGHT: 135 LBS | HEART RATE: 96 BPM | TEMPERATURE: 97.7 F | SYSTOLIC BLOOD PRESSURE: 115 MMHG | HEIGHT: 68 IN | BODY MASS INDEX: 20.46 KG/M2 | DIASTOLIC BLOOD PRESSURE: 77 MMHG

## 2021-05-10 VITALS — HEIGHT: 63.5 IN | BODY MASS INDEX: 25.2 KG/M2 | WEIGHT: 144 LBS

## 2021-05-10 DIAGNOSIS — S22.000S WEDGE COMPRESSION FRACTURE OF UNSPECIFIED THORACIC VERTEBRA, SEQUELA: ICD-10-CM

## 2021-05-10 PROCEDURE — 72070 X-RAY EXAM THORAC SPINE 2VWS: CPT

## 2021-05-10 PROCEDURE — 99214 OFFICE O/P EST MOD 30 MIN: CPT

## 2021-05-10 PROCEDURE — 99072 ADDL SUPL MATRL&STAF TM PHE: CPT

## 2021-05-10 RX ORDER — FOLIC ACID 1 MG/1
1 TABLET ORAL
Qty: 30 | Refills: 0 | Status: ACTIVE | COMMUNITY
Start: 2021-04-22

## 2021-05-10 RX ORDER — CYCLOBENZAPRINE HYDROCHLORIDE 15 MG/1
15 CAPSULE, EXTENDED RELEASE ORAL
Qty: 7 | Refills: 0 | Status: ACTIVE | COMMUNITY
Start: 2021-03-26

## 2021-05-17 PROBLEM — S22.000S COMPRESSION FRACTURE OF THORACIC VERTEBRA, SEQUELA: Status: ACTIVE | Noted: 2021-01-13

## 2021-05-17 NOTE — PHYSICAL EXAM
[Walker] : ambulates with walker [LE] : Sensory: Intact in bilateral lower extremities [DP] : dorsalis pedis 2+ and symmetric bilaterally [SLR] : negative straight leg raise [de-identified] : 1/5 EHL, ankle DF on left, 5/5 knee extension, hip flexion, plantar flexion\par seen with his wife\par Seen with a TLSO. The surgical incision site(s) was healed, not erythematous, absent of discharge, not swollen and not dehisced.  [de-identified] : 2 views of the thoracic spine obtained today demonstrate sternotomy wires in place.  Cement is seen in the vertebral body at the T12 and L3 vertebral bodies.  Previously noted compression deformity is again seen of the T11 T9 and T8 with segmental kyphosis apex at the T11 level.  Compression deformity also noted at the superior plate of L2 to a lesser extent also at L1.  These are all old findings with no new acute fracture noted.  Diffuse osteopenia.

## 2021-05-17 NOTE — HISTORY OF PRESENT ILLNESS
[Improving] : improving [5] : an average pain level of 5/10 [Bending] : worsened by bending [Lifting] : worsened by lifting [Prolonged Sitting] : worsened by prolonged sitting [Prolonged Standing] : worsened by prolonged standing [Walking] : worsened by walking [de-identified] : Patient is here today for followup of kyphoplasty 1/19/2. He states that he has been wearing his TLSO brace most of the time. He states that he feels more fatigued.\par Works from 9 to 2. Using AFO for left foot drop. \par Kyphoplasty T12, L3. \par Tramadol  mg as well as tramadol 50 mg prn, has not used oxycodone for weeks\par Liver function is stable, has CMV colitis, elevated crt to 2 down to 1.7, hb is 8.4 today, loss of appetite, currently in PT [Sitting] : not worsened by sitting [Standing] : not worsened by standing [Weight Bearing] : not worsened by weight bearing [Acetaminophen] : not relieved by acetaminophen [Acupuncture] : not relieved by acupuncture [Chiropractic] : not relieved by chiropractic manipulation [Exercise Regimen] : not relieved by exercise regimen [Heat] : not relieved by heat [Ice] : not relieved by ice [NSAIDs] : not relieved by nonsteroidal anti-inflammatory drugs [Opioid Analgesics] : not relieved by opioid analgesics [Physical Therapy] : not relieved by physical therapy [Recumbency] : not relieved by recumbency [Rest] : not relieved with rest [Ataxia] : no ataxia [Incontinence] : no incontinence [Loss of Dexterity] : good dexterity [Urinary Ret.] : no urinary retention [de-identified] : tramadol/ biofreeze

## 2021-05-17 NOTE — DISCUSSION/SUMMARY
[Medication Risks Reviewed] : Medication risks reviewed [de-identified] : At this point the patient has returned to work.  Recommended use of a brace for an additional 1 month.  Recommended physical therapy for him.  He will follow-up with the liver transplant service for further management of his liver transplant as well as the endocrinologist for further management of his osteoporosis.  I will see him back in 2 to 3 months on as-needed basis.  At this time his fractures appear to have stabilized with no additional fractures noted on x-rays today and his pain is also improved.  He has returned to work as a physician.  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  He understands that there is no surgical intervention needed for the foot drop given his current clinical presentation.  He will continue use of the AFO as long as necessary based on the physical therapy evaluation\par \par The patient was educated regarding their condition, treatment options as well as prescribed course of treatment. \par Risks and benefits as well as alternatives to the proposed treatment were also provided to the patient \par They were given the opportunity to have all their questions answered to their satisfaction.\par \par Vital signs were reviewed with the patient and the patient was instructed to followup with their primary care provider for further management.\par \par Healthy lifestyle recommendations were also made including a tobacco free lifestyle, proper diet, and weight control.

## 2021-05-23 ENCOUNTER — RX RENEWAL (OUTPATIENT)
Age: 61
End: 2021-05-23

## 2021-05-23 DIAGNOSIS — L70.9 ACNE, UNSPECIFIED: ICD-10-CM

## 2021-06-15 ENCOUNTER — APPOINTMENT (OUTPATIENT)
Dept: ENDOCRINOLOGY | Facility: CLINIC | Age: 61
End: 2021-06-15
Payer: COMMERCIAL

## 2021-06-15 VITALS
OXYGEN SATURATION: 99 % | TEMPERATURE: 98.3 F | WEIGHT: 133 LBS | BODY MASS INDEX: 23.19 KG/M2 | DIASTOLIC BLOOD PRESSURE: 80 MMHG | HEART RATE: 92 BPM | SYSTOLIC BLOOD PRESSURE: 126 MMHG

## 2021-06-15 PROCEDURE — 99072 ADDL SUPL MATRL&STAF TM PHE: CPT

## 2021-06-15 PROCEDURE — 99214 OFFICE O/P EST MOD 30 MIN: CPT

## 2021-06-15 RX ORDER — CYCLOBENZAPRINE HYDROCHLORIDE 5 MG/1
5 TABLET, FILM COATED ORAL
Qty: 60 | Refills: 0 | Status: DISCONTINUED | COMMUNITY
Start: 2020-12-15 | End: 2021-06-15

## 2021-06-15 RX ORDER — POLYETHYLENE GLYCOL 3350 17 G/17G
17 POWDER, FOR SOLUTION ORAL
Qty: 238 | Refills: 0 | Status: DISCONTINUED | COMMUNITY
Start: 2020-12-01 | End: 2021-06-15

## 2021-06-15 RX ORDER — PREDNISONE 5 MG/1
5 TABLET ORAL DAILY
Qty: 60 | Refills: 0 | Status: DISCONTINUED | COMMUNITY
End: 2021-06-15

## 2021-06-15 RX ORDER — PREDNISONE 2.5 MG/1
2.5 TABLET ORAL
Qty: 30 | Refills: 0 | Status: DISCONTINUED | COMMUNITY
Start: 2021-05-08 | End: 2021-06-15

## 2021-06-15 RX ORDER — NYSTATIN 100000 [USP'U]/ML
100000 SUSPENSION ORAL
Refills: 0 | Status: DISCONTINUED | COMMUNITY
End: 2021-06-15

## 2021-06-15 RX ORDER — CLINDAMYCIN PHOSPHATE 10 MG/ML
1 SOLUTION TOPICAL
Qty: 60 | Refills: 0 | Status: DISCONTINUED | COMMUNITY
Start: 2021-05-23 | End: 2021-06-15

## 2021-06-15 RX ORDER — DICYCLOMINE HYDROCHLORIDE 20 MG/1
20 TABLET ORAL
Qty: 60 | Refills: 0 | Status: DISCONTINUED | COMMUNITY
Start: 2021-04-21 | End: 2021-06-15

## 2021-06-15 RX ORDER — DIPHENOXYLATE HYDROCHLORIDE AND ATROPINE SULFATE 2.5; .025 MG/1; MG/1
2.5-0.025 TABLET ORAL
Qty: 60 | Refills: 0 | Status: DISCONTINUED | COMMUNITY
Start: 2021-04-28 | End: 2021-06-15

## 2021-06-15 RX ORDER — TERIPARATIDE 250 UG/ML
600 INJECTION, SOLUTION SUBCUTANEOUS
Qty: 3 | Refills: 1 | Status: DISCONTINUED | COMMUNITY
Start: 2021-03-09 | End: 2021-06-15

## 2021-06-15 RX ORDER — TACROLIMUS 5 MG/1
5 CAPSULE ORAL TWICE DAILY
Qty: 60 | Refills: 0 | Status: DISCONTINUED | COMMUNITY
End: 2021-06-15

## 2021-06-15 RX ORDER — MAGNESIUM OXIDE 241.3 MG/1000MG
400 TABLET ORAL
Refills: 0 | Status: DISCONTINUED | COMMUNITY
End: 2021-06-15

## 2021-06-15 RX ORDER — POLYETHYLENE GLYCOL 3350 17 G/17G
17 POWDER, FOR SOLUTION ORAL
Refills: 0 | Status: DISCONTINUED | COMMUNITY
End: 2021-06-15

## 2021-06-15 RX ORDER — OXYCODONE 5 MG/1
5 TABLET ORAL
Qty: 40 | Refills: 0 | Status: DISCONTINUED | COMMUNITY
Start: 2021-01-27 | End: 2021-06-15

## 2021-06-15 NOTE — ASSESSMENT
[Teriparatide/Abaloparatide Therapy] : Risks and benefits of Teriparatide/Abaloparatide therapy were discussed with the patient including potential risk of osteosarcoma [Other____] : [unfilled] [FreeTextEntry1] : Complicated 61 year-old male presents with progressive osteoporosis and fractures. Bone mineral density 2019 was moderately low but patient did not begin medical therapy for osteoporosis. The patient does have a previous history of apparently calcium oxalate kidney stones.\par \par Medical history is notable for recent liver transplantation for cryptogenic cirrhosis. Patient is on steroids as part of transplant treatment. He had L3 and severe T12 compression fractures with fairly severe pain. Pt had kyphoplasty 1/2021 w/ some relief in pain. BMD 1/2021 showed progressive worsening osteoporosis spine and hip versus outside study 2019 as well as severely low proximal radius. The patient is at extremely high risk for future fractures. Options of medical therapy were discussed in great detail with patient and spouse. Pt started Tymlos 1/2021. Taken correctly. Reports headaches and back pains for half hour after injection. No blood Ca elevation, lightheadedness, palpitations, or dizziness.\par Back pain improving.  Patient is function improving for example able to return to part-time work.\par Options of therapy reviewed in great detail. Recommend trying 1 month trial of Forteo to see if symptoms improve. Risks and benefits discussed. All questions were answered. Pt elects to transition to Forteo. If pt does not tolerate Forteo, then pt can transition to Prolia. Risks and benefits discussed. All questions were answered. Prescription sent.\par \par Labs reviewed: Ca 9.0, normal. Vitamin D 58, normal. Creatinine 1.24, normal. White Count 2.7, low. Platelet 32, low.\par \par F/u in 3 months

## 2021-06-15 NOTE — PHYSICAL EXAM
[Alert] : alert [Well Nourished] : well nourished [Normal Sclera/Conjunctiva] : normal sclera/conjunctiva [EOMI] : extra ocular movement intact [No Proptosis] : no proptosis [Thyroid Not Enlarged] : the thyroid was not enlarged [No Thyroid Nodules] : no palpable thyroid nodules [Clear to Auscultation] : lungs were clear to auscultation bilaterally [Normal Rate] : heart rate was normal [Normal S1, S2] : normal S1 and S2 [Regular Rhythm] : with a regular rhythm [No Edema] : no peripheral edema [Normal Bowel Sounds] : normal bowel sounds [Not Tender] : non-tender [Not Distended] : not distended [Soft] : abdomen soft [Normal Anterior Cervical Nodes] : no anterior cervical lymphadenopathy [No Spinal Tenderness] : no spinal tenderness [Kyphosis] : kyphosis present [No Stigmata of Cushings Syndrome] : no stigmata of Cushings Syndrome [Normal Reflexes] : deep tendon reflexes were 2+ and symmetric [Normal Strength/Tone] : muscle strength and tone were normal [Oriented x3] : oriented to person, place, and time [de-identified] : appears uncomfortable.  [de-identified] : anicteric [de-identified] : Marked kyphosis [de-identified] : ambulates w/ cane

## 2021-06-15 NOTE — HISTORY OF PRESENT ILLNESS
[Abaloparatide (Tymlos)] : Abaloparatide [Taking Steroids] : a history of taking steroids [Previous Fragility Fracture] : previous fragility fracture(s) [FreeTextEntry1] : \par \par Pt started Tymlos 1/2021. Taken correctly. Reports headaches and back pains for half hour after injection. No blood Ca elevation, lightheadedness, palpitations, or dizziness.  Back pain is improving moderately.  Increasing activity including resuming moderate work schedule.  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  Previously patient had history of coronary artery disease status post bypass surgery December 2019. Long history of kidney stones last passed 2019. The patient recently developed severe back pain has been found to have T12 and L3 compression fractures.  .Underwent g kyphoplasty with only.moderate pain relief.  \par \par The patient has no previous history of significant bone disease. He did have previous metatarsal fractures and a traumatic radius fracture. The patient has been on prednisone as part of the transplant therapy since November 2020 now decreased to 5 mg. Bone mineral density September 2019 was moderately low. Spine -2.7 total hip -2.3 femoral neck -2.7. The patient did not begin medical osteoporosis therapy at that time. Pt had kyphoplasty 1/2021 w/ some relief in pain. BMD 1/2021 shows progressive worsening osteoporosis spine and hip versus outside study 2019 as well as severely low proximal radius. \par Recent CMV infection

## 2021-06-15 NOTE — END OF VISIT
[FreeTextEntry3] : I, Bogdan Turcios, authored this note working as a medical scribe for Dr. Angel.  06/15/2021.  3:30PM.  This note was authored by the medical scribe for me. I have reviewed, edited, and revised the note as needed. I am in agreement with the exam findings, imaging findings, and treatment plan.  Maykel Angel MD

## 2021-06-16 ENCOUNTER — APPOINTMENT (OUTPATIENT)
Dept: NEUROLOGY | Facility: CLINIC | Age: 61
End: 2021-06-16
Payer: COMMERCIAL

## 2021-06-16 PROCEDURE — 95886 MUSC TEST DONE W/N TEST COMP: CPT

## 2021-06-16 PROCEDURE — 99072 ADDL SUPL MATRL&STAF TM PHE: CPT

## 2021-06-16 PROCEDURE — 95909 NRV CNDJ TST 5-6 STUDIES: CPT

## 2021-07-03 ENCOUNTER — RX RENEWAL (OUTPATIENT)
Age: 61
End: 2021-07-03

## 2021-07-13 ENCOUNTER — OUTPATIENT (OUTPATIENT)
Dept: OUTPATIENT SERVICES | Facility: HOSPITAL | Age: 61
LOS: 1 days | End: 2021-07-13

## 2021-07-13 ENCOUNTER — APPOINTMENT (OUTPATIENT)
Dept: ULTRASOUND IMAGING | Facility: CLINIC | Age: 61
End: 2021-07-13
Payer: COMMERCIAL

## 2021-07-13 DIAGNOSIS — Z95.1 PRESENCE OF AORTOCORONARY BYPASS GRAFT: Chronic | ICD-10-CM

## 2021-07-13 DIAGNOSIS — Z94.4 LIVER TRANSPLANT STATUS: Chronic | ICD-10-CM

## 2021-07-13 DIAGNOSIS — K83.1 OBSTRUCTION OF BILE DUCT: ICD-10-CM

## 2021-07-13 DIAGNOSIS — Z98.890 OTHER SPECIFIED POSTPROCEDURAL STATES: Chronic | ICD-10-CM

## 2021-07-13 PROCEDURE — 76700 US EXAM ABDOM COMPLETE: CPT | Mod: 26

## 2021-08-03 ENCOUNTER — APPOINTMENT (OUTPATIENT)
Dept: INTERNAL MEDICINE | Facility: CLINIC | Age: 61
End: 2021-08-03

## 2021-08-06 ENCOUNTER — APPOINTMENT (OUTPATIENT)
Dept: UROLOGY | Facility: CLINIC | Age: 61
End: 2021-08-06

## 2021-08-11 ENCOUNTER — APPOINTMENT (OUTPATIENT)
Dept: ORTHOPEDIC SURGERY | Facility: CLINIC | Age: 61
End: 2021-08-11
Payer: COMMERCIAL

## 2021-08-11 ENCOUNTER — NON-APPOINTMENT (OUTPATIENT)
Age: 61
End: 2021-08-11

## 2021-08-11 VITALS
WEIGHT: 135 LBS | BODY MASS INDEX: 23.54 KG/M2 | SYSTOLIC BLOOD PRESSURE: 120 MMHG | DIASTOLIC BLOOD PRESSURE: 81 MMHG | HEART RATE: 82 BPM

## 2021-08-11 DIAGNOSIS — Z87.19 PERSONAL HISTORY OF OTHER DISEASES OF THE DIGESTIVE SYSTEM: ICD-10-CM

## 2021-08-11 PROCEDURE — 99213 OFFICE O/P EST LOW 20 MIN: CPT

## 2021-08-11 PROCEDURE — 72070 X-RAY EXAM THORAC SPINE 2VWS: CPT

## 2021-08-25 ENCOUNTER — TRANSCRIPTION ENCOUNTER (OUTPATIENT)
Age: 61
End: 2021-08-25

## 2021-08-27 ENCOUNTER — OUTPATIENT (OUTPATIENT)
Dept: OUTPATIENT SERVICES | Facility: HOSPITAL | Age: 61
LOS: 1 days | End: 2021-08-27
Payer: COMMERCIAL

## 2021-08-27 VITALS
HEART RATE: 78 BPM | HEIGHT: 65 IN | WEIGHT: 134.92 LBS | DIASTOLIC BLOOD PRESSURE: 89 MMHG | TEMPERATURE: 97 F | OXYGEN SATURATION: 98 % | SYSTOLIC BLOOD PRESSURE: 114 MMHG | RESPIRATION RATE: 21 BRPM

## 2021-08-27 VITALS
SYSTOLIC BLOOD PRESSURE: 135 MMHG | RESPIRATION RATE: 15 BRPM | OXYGEN SATURATION: 97 % | DIASTOLIC BLOOD PRESSURE: 93 MMHG | HEART RATE: 75 BPM

## 2021-08-27 DIAGNOSIS — K59.00 CONSTIPATION, UNSPECIFIED: ICD-10-CM

## 2021-08-27 DIAGNOSIS — Z94.4 LIVER TRANSPLANT STATUS: Chronic | ICD-10-CM

## 2021-08-27 DIAGNOSIS — Z95.1 PRESENCE OF AORTOCORONARY BYPASS GRAFT: Chronic | ICD-10-CM

## 2021-08-27 DIAGNOSIS — Z98.890 OTHER SPECIFIED POSTPROCEDURAL STATES: Chronic | ICD-10-CM

## 2021-08-27 PROCEDURE — G0105: CPT

## 2021-08-27 RX ORDER — DAPSONE 100 MG/1
1 TABLET ORAL
Qty: 0 | Refills: 0 | DISCHARGE

## 2021-08-27 RX ORDER — CYCLOBENZAPRINE HYDROCHLORIDE 10 MG/1
1 TABLET, FILM COATED ORAL
Qty: 0 | Refills: 0 | DISCHARGE

## 2021-08-27 RX ORDER — NYSTATIN 500MM UNIT
5 POWDER (EA) MISCELLANEOUS
Qty: 0 | Refills: 0 | DISCHARGE

## 2021-08-27 RX ORDER — TACROLIMUS 5 MG/1
7 CAPSULE ORAL
Qty: 0 | Refills: 0 | DISCHARGE

## 2021-08-27 RX ORDER — MYCOPHENOLATE MOFETIL 250 MG/1
2 CAPSULE ORAL
Qty: 0 | Refills: 0 | DISCHARGE

## 2021-08-27 RX ORDER — TERIPARATIDE 250 UG/ML
0 INJECTION, SOLUTION SUBCUTANEOUS
Qty: 0 | Refills: 0 | DISCHARGE

## 2021-08-27 RX ORDER — POLYETHYLENE GLYCOL 3350 17 G/17G
1 POWDER, FOR SOLUTION ORAL
Qty: 0 | Refills: 0 | DISCHARGE

## 2021-08-27 RX ORDER — PANTOPRAZOLE SODIUM 20 MG/1
1 TABLET, DELAYED RELEASE ORAL
Qty: 0 | Refills: 0 | DISCHARGE

## 2021-08-27 RX ORDER — VALGANCICLOVIR 450 MG/1
2 TABLET, FILM COATED ORAL
Qty: 0 | Refills: 0 | DISCHARGE

## 2021-08-27 RX ORDER — OXYCODONE HYDROCHLORIDE 5 MG/1
1 TABLET ORAL
Qty: 0 | Refills: 0 | DISCHARGE

## 2021-08-27 RX ORDER — ASPIRIN/CALCIUM CARB/MAGNESIUM 324 MG
1 TABLET ORAL
Qty: 0 | Refills: 0 | DISCHARGE

## 2021-08-27 RX ORDER — MAGNESIUM OXIDE 400 MG ORAL TABLET 241.3 MG
1 TABLET ORAL
Qty: 0 | Refills: 0 | DISCHARGE

## 2021-08-27 NOTE — ASU PATIENT PROFILE, ADULT - NSICDXPASTMEDICALHX_GEN_ALL_CORE_FT
PAST MEDICAL HISTORY:  3-vessel CAD     Essential thrombocytopenia resolved with transplant    [place] syndrome     H/O osteoporosis     H/O right bundle branch block     Kidney stone 2019    Liver cirrhosis cryptogenic    Lumbar compression fracture 2021    Pleural effusion lower left, related to heart surgery, resolving    Thoracic compression fracture 2021

## 2021-08-27 NOTE — ASU DISCHARGE PLAN (ADULT/PEDIATRIC) - CLICK TO LAUNCH ORM
Take medications as prescribed.   Take ibuprofen/tylenol for pain and/or fevers.  Drink plenty of fluids.  Eat a bland diet until you can tolerate more foods.  Follow up with primary care provider in the next 1-2 days.  Return to ED if any new or worsening symptoms such as loss of appetite, inability to keep fluids down, or fever.  
.

## 2021-08-27 NOTE — PRE PROCEDURE NOTE - PRE PROCEDURE EVALUATION
EDI [id number]  61yMale  T(C): 36.2 (08-27-21 @ 14:57), Max: 36.2 (08-27-21 @ 14:57)  HR: 78 (08-27-21 @ 14:57) (78 - 78)  BP: 114/89 (08-27-21 @ 14:57) (114/89 - 114/89)  RR: 21 (08-27-21 @ 14:57) (21 - 21)  SpO2: 98% (08-27-21 @ 14:57) (98% - 98%)  Wt(kg): --  normal cephalic atraumatic  s1s2   clear to ascultation bilaterally  soft, non tender, non distended no guarding or rebound  no clubbing cyanosis or edema

## 2021-09-30 ENCOUNTER — APPOINTMENT (OUTPATIENT)
Dept: ENDOCRINOLOGY | Facility: CLINIC | Age: 61
End: 2021-09-30
Payer: COMMERCIAL

## 2021-09-30 VITALS
BODY MASS INDEX: 23.39 KG/M2 | SYSTOLIC BLOOD PRESSURE: 120 MMHG | DIASTOLIC BLOOD PRESSURE: 78 MMHG | RESPIRATION RATE: 15 BRPM | TEMPERATURE: 98.2 F | WEIGHT: 132 LBS | OXYGEN SATURATION: 99 % | HEIGHT: 63 IN | HEART RATE: 78 BPM

## 2021-09-30 PROCEDURE — 99214 OFFICE O/P EST MOD 30 MIN: CPT

## 2021-10-01 NOTE — ASSESSMENT
[Teriparatide/Abaloparatide Therapy] : Risks and benefits of Teriparatide/Abaloparatide therapy were discussed with the patient including potential risk of osteosarcoma [Other____] : [unfilled] [FreeTextEntry1] : Complicated 61 year-old male presents with progressive osteoporosis and fractures. Bone mineral density 2019 was moderately low but patient did not begin medical therapy for osteoporosis. The patient does have a previous history of apparently calcium oxalate kidney stones.\par \par Medical history is notable for recent liver transplantation for cryptogenic cirrhosis. Patient is on steroids as part of transplant treatment. He had L3 and severe T12 compression fractures with fairly severe pain. Pt had kyphoplasty 1/2021 w/ some relief in pain. BMD 1/2021 showed progressive worsening osteoporosis spine and hip versus outside study 2019 as well as severely low proximal radius. The patient is at extremely high risk for future fractures. Options of medical therapy were discussed in great detail with patient and spouse. Pt started Tymlos 1/2021. Taken correctly but he reported headaches and back pains for half hour after injection. Transitioned to Forteo 6/2021. Taken correctly, tolerated well. No blood Ca elevation, lightheadedness, palpitations, or dizziness. Consider transitioning to Prolia upon completing 1 year of Forteo.\par \par Back pain much improved, now chronic low grade pain. Patient is function improving for example able to return to part-time work.\par \par Marked kyphosis, stable on physical examination.\par \par H/o Vitamin D Deficiency. Continue Vitamin D 46993 IU.\par \par Labs 9/2021 reviewed: Ca 9.0, normal. Creatinine 1.01, normal. Mag 1.7, normal. Alk Phos 509, high.\par \par F/u in 3 months w/ BMD

## 2021-10-01 NOTE — HISTORY OF PRESENT ILLNESS
[Taking Steroids] : a history of taking steroids [Previous Fragility Fracture] : previous fragility fracture(s) [Abaloparatide (Tymlos)] : Abaloparatide [Teriparatide (Forteo)] : Teriparatide [FreeTextEntry1] : No interval fractures or change in medications.\par \par Pt started Tymlos 1/2021. Took correctly but had headaches and back pains for half hour after injection. Transitioned to Forteo 6/2021. Taken correctly, tolerated well. No blood Ca elevation, lightheadedness, palpitations, or dizziness. Back pain significantly improved, now chronic low grade back pain. Increasing activity including resuming moderate work schedule. 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  Previously patient had history of coronary artery disease status post bypass surgery December 2019. Long history of kidney stones last passed 2019. The patient recently developed severe back pain has been found to have T12 and L3 compression fractures. Underwent kyphoplasty with only.moderate pain relief. \par \par The patient has no previous history of significant bone disease. He did have previous metatarsal fractures and a traumatic radius fracture. The patient has been on prednisone as part of the transplant therapy since November 2020 now decreased to 5 mg. Bone mineral density September 2019 was moderately low. Spine -2.7 total hip -2.3 femoral neck -2.7. The patient did not begin medical osteoporosis therapy at that time. Pt had kyphoplasty 1/2021 w/ some relief in pain. BMD 1/2021 shows progressive worsening osteoporosis spine and hip versus outside study 2019 as well as severely low proximal radius. \par \par 7/2021 he had CMV, Colitis, then EBV, treated w/ Valtrex.\par 8/2021 was admitted at hospital due pancreatitis which resolved on own.\par 9/2021 he had ERCP due to elevatedbili, had stricture resulting in metal stent being placed, but failed, had 2 replacement stents inserted.

## 2021-10-01 NOTE — PHYSICAL EXAM
[Alert] : alert [Normal Sclera/Conjunctiva] : normal sclera/conjunctiva [EOMI] : extra ocular movement intact [No Proptosis] : no proptosis [Thyroid Not Enlarged] : the thyroid was not enlarged [No Thyroid Nodules] : no palpable thyroid nodules [Clear to Auscultation] : lungs were clear to auscultation bilaterally [Normal S1, S2] : normal S1 and S2 [Normal Rate] : heart rate was normal [Regular Rhythm] : with a regular rhythm [No Edema] : no peripheral edema [Normal Bowel Sounds] : normal bowel sounds [Not Tender] : non-tender [Not Distended] : not distended [Soft] : abdomen soft [Normal Anterior Cervical Nodes] : no anterior cervical lymphadenopathy [No Spinal Tenderness] : no spinal tenderness [Kyphosis] : kyphosis present [No Stigmata of Cushings Syndrome] : no stigmata of Cushings Syndrome [Normal Reflexes] : deep tendon reflexes were 2+ and symmetric [Oriented x3] : oriented to person, place, and time [de-identified] : anicteric [de-identified] : Marked kyphosis [de-identified] : ambulates w/ cane

## 2021-10-01 NOTE — END OF VISIT
[FreeTextEntry3] : I, Bogdan Turcios, authored this note working as a medical scribe for Dr. Angel.  09/30/2021.  4:45PM. This note was authored by the medical scribe for me. I have reviewed, edited, and revised the note as needed. I am in agreement with the exam findings, imaging findings, and treatment plan.  Maykel Angel MD

## 2021-10-15 ENCOUNTER — RX RENEWAL (OUTPATIENT)
Age: 61
End: 2021-10-15

## 2021-10-19 ENCOUNTER — APPOINTMENT (OUTPATIENT)
Dept: NEUROLOGY | Facility: CLINIC | Age: 61
End: 2021-10-19
Payer: COMMERCIAL

## 2021-10-19 VITALS — DIASTOLIC BLOOD PRESSURE: 88 MMHG | SYSTOLIC BLOOD PRESSURE: 132 MMHG | HEART RATE: 103 BPM

## 2021-10-19 DIAGNOSIS — M21.372 FOOT DROP, LEFT FOOT: ICD-10-CM

## 2021-10-19 PROCEDURE — 99213 OFFICE O/P EST LOW 20 MIN: CPT

## 2021-10-20 PROBLEM — M21.372 FOOT DROP, LEFT: Status: ACTIVE | Noted: 2021-04-08

## 2021-10-20 NOTE — DATA REVIEWED
[de-identified] : I reviewed the electronic medical records regarding his abdominal surgical procedures and has no neurological pertinence.

## 2021-10-20 NOTE — PHYSICAL EXAM
[FreeTextEntry1] : Vital signs are recorded and unremarkable. The neurological examination was limited to lower extremity evaluation. Memory language cognitive and behavioral functions are normal and he is working part-time and today came to my office walking with a cane. She did not offer any cranial nerve or upper extremity problems.\par \par In the lower extremities besides minimal weakness of the left extensor digitorum longus and peroneus longus muscle which are +4 2-5/5 all other muscles including tibialis anterior and extensor hallucis longus dorsiflexors and plantar flexors on the left leg are 5/5 including the hamstrings quadriceps gluteals. He has significant muscle loss due to his chronic illness. His ankle reflexes are absent bilaterally but knees are preserved and there is no Babinski sign. Sensations are normal except for historically there is a minor patch of sensory dysfunction in the anterior shin which is not bothersome or neuropathic painful symptoms. He walks with a cane. The patient has fine tremors in the fingers without rigidity or spasticity or focal motor weakness in the hands and is probably as result of his medications.\par \par

## 2021-10-20 NOTE — DISCUSSION/SUMMARY
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 Follow-up appointment on a as needed basis was suggested.

## 2021-10-20 NOTE — REVIEW OF SYSTEMS
[Leg Weakness] : leg weakness [Numbness] : numbness [As Noted in HPI] : as noted in HPI [Negative] : Heme/Lymph

## 2021-10-20 NOTE — HISTORY OF PRESENT ILLNESS
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 Is now walking with a cane and the improvement has been dramatic and does not use splints and is working part-time and feels very satisfied with his improvement does not have any trips and falls.\par \par He has history of cirrhosis and recently had ERCP for hyperbilirubinemia with a stent which subsequently failed and developed pancreatitis requiring admission to the hospital for 4 days and work-up was reportedly negative and had a second ERCP with 2 stents as the first 1 dislodged and is completely asymptomatic at this time.\par \par I reviewed his subsequent medical records in the electronic medical system and noted the contents without any neurological pertinence. I reviewed his medications and allergies. I reviewed

## 2021-11-15 ENCOUNTER — APPOINTMENT (OUTPATIENT)
Dept: ORTHOPEDIC SURGERY | Facility: CLINIC | Age: 61
End: 2021-11-15
Payer: COMMERCIAL

## 2021-11-15 VITALS
BODY MASS INDEX: 23.39 KG/M2 | WEIGHT: 132 LBS | DIASTOLIC BLOOD PRESSURE: 88 MMHG | SYSTOLIC BLOOD PRESSURE: 133 MMHG | HEART RATE: 80 BPM | HEIGHT: 63 IN

## 2021-11-15 PROCEDURE — 72100 X-RAY EXAM L-S SPINE 2/3 VWS: CPT

## 2021-11-15 PROCEDURE — 72070 X-RAY EXAM THORAC SPINE 2VWS: CPT

## 2021-11-15 PROCEDURE — 99214 OFFICE O/P EST MOD 30 MIN: CPT

## 2021-11-15 NOTE — HISTORY OF PRESENT ILLNESS
[Stable] : stable [6] : a current pain level of 6/10 [Daily] : ~He/She~ states the symptoms seem to be occuring daily [Prolonged Standing] : worsened by prolonged standing [Standing] : worsened by standing [Rest] : relieved by rest [___ mths] : [unfilled] month(s) ago [de-identified] : Patient is here today for re evaluation on his compression fractures of thoracic and lumbar spine Kyphplasty 1/19/21. Overall feeling okay but last couple of days stiffness.\par Had had 2 hospitalizations since last visit for elevated bilirubin - has had multiple ERCPs. Goes to [place]. \par Not much PT, has been walking mostly

## 2021-11-15 NOTE — DISCUSSION/SUMMARY
[Medication Risks Reviewed] : Medication risks reviewed [de-identified] : Takes tramadol once or twice a month for pain. Used oxycodone for MRI. Tylenol before work day. Flexeril 5mg prn mostly for back spasms. Biofreeze prn. Walks 1-2 block ET. Recommended continued physical therapy. Recommended self-directed exercise program where he increase the frequency but minimize the duration of his walking and exercises. Recommended follow-up in 6 months on as-needed basis. Encouraged protein-based diet to maintain his weight. He will follow-up with the liver transplant service as needed.\par \par Based on x-rays obtained today no additional fractures are identified.\par \par I spent over 30 minutes reviewing the patient's previously performed imaging studies comparing them to today's x-rays as well as discussing the plan of care as outlined above.

## 2021-11-15 NOTE — PHYSICAL EXAM
[Stooped] : stooped [LE] : Sensory: Intact in bilateral lower extremities [DP] : dorsalis pedis 2+ and symmetric bilaterally [SLR] : negative straight leg raise [de-identified] : 5/5 EHL, ankle DF on left, 5/5 knee extension, hip flexion, plantar flexion\par  The surgical incision site(s) was healed, not erythematous, absent of discharge, not swollen and not dehisced in the lumbar kyphoplasty sites [de-identified] : thoracic kyphosis [de-identified] :  AP and lateral image of the thoracic spine obtained to compare with previous x-rays. Out of brace sternotomy wires noted. Cement is seen in the vertebral body at the T12 level. Superior plate compression seen on the T11 level which is stable when compared with prior x-rays and is the apex of the kyphotic deformity. Superior plate irregularity also seen at T10 which is stable. No obvious acute fractures identified.\par \par AP and lateral image of the lumbar spine demonstrate cement in the vertebral body at T12 and L3. Superior plate depression seen on the L2 and L1 vertebral bodies which are essentially unchanged from prior. Normal lumbar lordosis. Calcification abdominal aorta. Vascular clips throughout the abdomen. No obvious acute fractures noted. Bile stent noted is unchanged\par

## 2021-11-15 NOTE — REASON FOR VISIT
[Follow-Up Visit] : a follow-up visit for [FreeTextEntry2] : Kyphoplasty 1/19/21  compression fractures thoracic and lumbar spine

## 2021-11-23 ENCOUNTER — APPOINTMENT (OUTPATIENT)
Dept: INTERNAL MEDICINE | Facility: CLINIC | Age: 61
End: 2021-11-23
Payer: COMMERCIAL

## 2021-11-23 DIAGNOSIS — M81.8 OTHER OSTEOPOROSIS W/OUT CURRENT PATHOLOGICAL FRACTURE: ICD-10-CM

## 2021-11-23 DIAGNOSIS — B27.90 INFECTIOUS MONONUCLEOSIS, UNSPECIFIED W/OUT COMPLICATION: ICD-10-CM

## 2021-11-23 PROCEDURE — G0008: CPT

## 2021-11-23 PROCEDURE — 99214 OFFICE O/P EST MOD 30 MIN: CPT | Mod: 25

## 2021-11-23 PROCEDURE — 90686 IIV4 VACC NO PRSV 0.5 ML IM: CPT

## 2021-11-23 NOTE — ADDENDUM
[FreeTextEntry1] : I, Janes Shah, acted solely as scribe for Dr. Portillo Mckeon DO on this date 11/23/2021  3:30PM .\par \par All medical record entries made by the Scribe were at my, Dr. Portillo Mckeon DO direction and personally dictated by me on 11/23/2021  3:30PM. I have reviewed the chart and agree that the record accurately reflects my personal performance of the history, physical exam, assessment and plan. I have also personally directed, reviewed and agreed with the chart.\par

## 2021-11-23 NOTE — REVIEW OF SYSTEMS
[Constipation] : constipation [Back Pain] : back pain [Negative] : Heme/Lymph [Recent Change In Weight] : ~T recent weight change [Abdominal Pain] : abdominal pain [FreeTextEntry2] : weight loss; cold intolerance  [FreeTextEntry7] : ventral hernia

## 2021-11-23 NOTE — ASSESSMENT
[FreeTextEntry1] : Patient is a 61 year old male with a past medical history as above who presents for the flu vaccine and general follow-up.

## 2021-11-23 NOTE — HISTORY OF PRESENT ILLNESS
[FreeTextEntry1] : flu vaccine and general follow-up [de-identified] : Patient is a 61 year old male with a past medical history as below who presents for the flu vaccine and general follow-up. Patient has received 3 doses of the COVID-19 Vaccine. Patient is s/p liver transplantation in November 2020. Patient continues to follow up with hepatologist, Dr. Dinh. Tacrolimus dosage was decreased from 1mg BID to 1mg in AM and 0.5mg in PM. Patient was also started on Everolimus. Patient notes recent hospitalization for elevated bilirubin. He has had multiple ERCPs. Patient is on Valtrex secondary to elevated EBV Antibody level. Patient has noted abdominal pain which he states is partially secondary to colonic stent. He has noted episodes of constipation. Patient notes ventral hernia. He saw a surgeon at [facility] who recommended surgical repair. Patient continues to follow up with orthopedic surgeon, Dr. Espana given history of L3/T12 fracture, s/p kyphoplasty. He has been able to ambulate without significant issues. He has been taking Tramadol as needed for pain. He notes taking Oxycodone prior to having MRIs done. Patient has noted cold intolerance likely secondary to significant weight loss over the course of the past year.

## 2021-11-23 NOTE — PLAN
[FreeTextEntry1] : Immunization\par flu vaccine - Fluarix Quadrivalent 0.5mL x 1 administered intramuscularly\par Cardiology\par arteriosclerosis of coronary artery - s/p CABG - continue Aspirin 81mg p.o.q.d. as directed - continue to follow up with cardiologist, Dr. Quan\par Gastroenterology\par cirrhotic liver - s/p transplantation - continue Mycophenolate Mofetil 500mg BID p.o.q.d. as directed, Everolimus p.o.q.d. as directed, Tacrolimus (Prograf) 1mg in AM and 0.5mg in PM p.o.q.d. as directed - continue to follow up liver specialist, Dr. Dinh\par GERD - continue Pantoprazole Sodium 40mg p.o.q.d. - continue antireflux measures\par Endocrinology/Musculoskeletal\par osteoporosis - continue Tymlos 80mcg subcutaneous injections q.d. as directed - continue to follow up with endocrinologist, Dr. Angel\par Musculoskeletal\par L3/T12 fracture - s/p kyphoplasty - continue Tramadol HCl 50mg QID p.o. p.r.n. as directed - continue to follow up with orthopedic surgeon, Dr. Espana  \par Infectious Disease\par EBV Infection - continue Valacyclovir HCl 500mg BID p.o.q.d. as directed

## 2021-11-29 ENCOUNTER — TRANSCRIPTION ENCOUNTER (OUTPATIENT)
Age: 61
End: 2021-11-29

## 2021-11-30 ENCOUNTER — APPOINTMENT (OUTPATIENT)
Dept: ENDOCRINOLOGY | Facility: CLINIC | Age: 61
End: 2021-11-30

## 2022-01-04 ENCOUNTER — RX RENEWAL (OUTPATIENT)
Age: 62
End: 2022-01-04

## 2022-01-25 ENCOUNTER — NON-APPOINTMENT (OUTPATIENT)
Age: 62
End: 2022-01-25

## 2022-01-31 ENCOUNTER — APPOINTMENT (OUTPATIENT)
Dept: ENDOCRINOLOGY | Facility: CLINIC | Age: 62
End: 2022-01-31
Payer: COMMERCIAL

## 2022-01-31 VITALS
OXYGEN SATURATION: 99 % | HEIGHT: 62.3 IN | BODY MASS INDEX: 24.53 KG/M2 | WEIGHT: 135 LBS | DIASTOLIC BLOOD PRESSURE: 84 MMHG | TEMPERATURE: 97.5 F | SYSTOLIC BLOOD PRESSURE: 128 MMHG | HEART RATE: 77 BPM

## 2022-01-31 PROCEDURE — 77080 DXA BONE DENSITY AXIAL: CPT

## 2022-01-31 PROCEDURE — ZZZZZ: CPT

## 2022-01-31 PROCEDURE — 99214 OFFICE O/P EST MOD 30 MIN: CPT | Mod: 25

## 2022-02-01 NOTE — PHYSICAL EXAM
[Alert] : alert [Normal Sclera/Conjunctiva] : normal sclera/conjunctiva [EOMI] : extra ocular movement intact [No Proptosis] : no proptosis [Thyroid Not Enlarged] : the thyroid was not enlarged [No Thyroid Nodules] : no palpable thyroid nodules [Clear to Auscultation] : lungs were clear to auscultation bilaterally [Normal S1, S2] : normal S1 and S2 [Normal Rate] : heart rate was normal [Regular Rhythm] : with a regular rhythm [No Edema] : no peripheral edema [Normal Bowel Sounds] : normal bowel sounds [Not Tender] : non-tender [Not Distended] : not distended [Soft] : abdomen soft [Normal Anterior Cervical Nodes] : no anterior cervical lymphadenopathy [No Spinal Tenderness] : no spinal tenderness [Kyphosis] : kyphosis present [No Stigmata of Cushings Syndrome] : no stigmata of Cushings Syndrome [Normal Reflexes] : deep tendon reflexes were 2+ and symmetric [Oriented x3] : oriented to person, place, and time [de-identified] : anicteric [de-identified] : severe [de-identified] : ambulates w/ cane [de-identified] : Mild erythema right lower extremity

## 2022-02-01 NOTE — END OF VISIT
[FreeTextEntry3] : I, Bogdan Turcios, authored this note working as a medical scribe for Dr. Angel.  01/31/2022.  3:45PM. This note was authored by the medical scribe for me. I have reviewed, edited, and revised the note as needed. I am in agreement with the exam findings, imaging findings, and treatment plan.  Maykel Angel MD

## 2022-02-01 NOTE — HISTORY OF PRESENT ILLNESS
[Abaloparatide (Tymlos)] : Abaloparatide [Teriparatide (Forteo)] : Teriparatide [Taking Steroids] : a history of taking steroids [Previous Fragility Fracture] : previous fragility fracture(s) [FreeTextEntry1] : No interval fractures or change in medications.\par \par Pt started Tymlos 1/2021. Took correctly but had headaches and back pains for half hour after injection. Transitioned to Forteo 6/2021. Taken correctly, tolerated well. No blood Ca elevation, lightheadedness, palpitations, or dizziness. Back pain significantly improved, now has minimal back pain. Patient is now working mostly full time, can do moderate social activities like golfing.\par \par MD, geriatrician, complicated hx developed cryptogenic cirrhosis eventually leading to liver transplantation November 2020.  Previously patient had history of coronary artery disease status post bypass surgery December 2019. Long history of kidney stones last passed 2019. The patient recently developed severe back pain has been found to have T12 and L3 compression fractures. Underwent kyphoplasty with only.moderate pain relief. \par \par The patient has no previous history of significant bone disease. He did have previous metatarsal fractures and a traumatic radius fracture. The patient has been on prednisone as part of the transplant therapy since 11/2020 now decreased to 5 mg. BMD 9/2019 was moderately low. Spine -2.7 total hip -2.3 femoral neck -2.7. The patient did not begin medical osteoporosis therapy at that time. Pt had kyphoplasty 1/2021 w/ some relief in pain. BMD 1/2021 shows progressive worsening osteoporosis spine and hip versus outside study 2019 as well as severely low proximal radius.\par \par 7/2021 he had CMV, Colitis, then EBV, treated w/ Valtrex.\par 8/2021 was admitted at hospital due pancreatitis which resolved on own.\par 9/2021 he had ERCP due to elevated bili, had stricture resulting in metal stent being placed, but failed, had 2 replacement stents inserted.\par 12/2021 he had all stents removed.\par 12/2021 had mild COVID. No hospitalization. No residual symptoms.\par Currently doxycycline for cellulitis on right leg.

## 2022-02-01 NOTE — PROCEDURE
[facility]    [address] [place] [id number]    Phone:  [phone]    Fax:  [phone]       Thank You for choosing us for your health care visit. We are glad to serve you and happy to provide you with this summary of your visit. Please help us to ensure we have accurate records. If you find anything that needs to be changed, please let our staff know as soon as possible.          Your Demographic Information     Patient Name Sex     Víctor Myles Male 1971       Ethnic Group Patient Race    Not of  or  Origin White      Your Visit Details     Date & Time Provider Department    5/15/2017 3:30 PM Leah Iyer LCSW [facility]      Your Upcoming Appointment*(Max 10)     2017  4:30 PM CDT   RED FOLLOW-UP with Leah Iyer LCSW   [facility] ([facility])    [address] [place] [id number] [phone]              Your To Do List     Follow-Up    Return in about 2 weeks (around 2017).      We Ordered or Performed the Following     PSYCH DIAGNOSTIC EVALUATION       Conditions Discussed Today or Order-Related Diagnoses        Comments    Alcohol use disorder, moderate, dependence (CMS/HCC)    -  Primary       Your Vitals Were     Smoking Status                   Current Every Day Smoker           Medications Prescribed or Re-Ordered Today     None      Allergies     Dander       Immunizations History as of 5/15/2017     Name Date    Tdap 2012      Problem List as of 5/15/2017     Closed fracture of neck of metacarpal bone(s)            Patient Instructions     None      
[FreeTextEntry1] : Bone mineral density: 01/31/2022 \par Indication: vs. 2021\par Spine: (L4 only) -2.7 osteoporosis, prior -3.2 osteoporosis\par Total hip: -3.2 osteoporosis, -7.6%\par Femoral neck: -3.7 osteoporosis, -8.4%\par Proximal radius: -5.5 osteoporosis, no significant change\par \par Bone mineral density January 11, 2021\par Indication recent compression fractures new chronic steroids.\par Compared to outside study 2019\par Spine excluding compression L3 -3.4 osteoporosis prior -2.7\par Total hip -2.9 osteoporosis prior -2.3\par Femoral neck -3.4 osteoporosis prior \par Proximal radius -5.3, severe osteoporosis no prior -2.7

## 2022-02-01 NOTE — ASSESSMENT
[Denosumab Therapy] : Risks  and benefits of denosumab therapy were discussed with the patient including eczema, cellulitis, osteonecrosis of the jaw and atypical femur fractures [FreeTextEntry1] : Complicated 62 year-old male presents with progressive osteoporosis and fractures. BMD 9/2019 was moderately low but patient did not begin medical therapy for osteoporosis. The patient does have a previous history of apparently calcium oxalate kidney stones.\par \par Medical history is notable for recent liver transplantation for cryptogenic cirrhosis. Patient is on steroids as part of transplant treatment. He had L3 and severe T12 compression fractures with fairly severe pain. Pt had kyphoplasty 1/2021 w/ some relief in pain. BMD 1/2021 showed progressive worsening osteoporosis spine and hip versus outside study 2019 as well as severely low proximal radius. The patient is at extremely high risk for future fractures. Options of medical therapy were discussed in great detail with patient and spouse. Pt started Tymlos 1/2021. Taken correctly but he reported headaches and back pains for half hour after injection. Transitioned to Forteo 6/2021. Taken correctly, tolerated well. No blood Ca elevation, lightheadedness, palpitations, or dizziness. BMD 1/2022 indicates improved osteoporosis in spine, worsened low osteoporosis in hip, and severely low osteoporosis in proximal radius. BMD results reviewed w/ pt. Recommend pt complete last dose of Forteo then transition to Prolia. Pt agrees to transition to Prolia.\par \par Back pain much improved, now has minimal pain. Patient is now working mostly full time, can do moderate social activities like golfing.\par \par Marked kyphosis, clinically stable on physical examination.\par \par H/o Vitamin D Deficiency. Continue Vitamin D 15097 IU weekly.\par \par F/u in 3 weeks for Prolia\par \par LEFTY Spears, et al. Treatment sequence matters: Anabolic and antiresorptive therapy for osteoporosis. [facility] 2017: 32 [id number]

## 2022-02-22 ENCOUNTER — APPOINTMENT (OUTPATIENT)
Dept: ENDOCRINOLOGY | Facility: CLINIC | Age: 62
End: 2022-02-22
Payer: COMMERCIAL

## 2022-02-22 PROCEDURE — 96401 CHEMO ANTI-NEOPL SQ/IM: CPT

## 2022-02-22 RX ORDER — DENOSUMAB 60 MG/ML
60 INJECTION SUBCUTANEOUS
Qty: 1 | Refills: 0 | Status: COMPLETED | OUTPATIENT
Start: 2022-02-22

## 2022-02-22 RX ADMIN — DENOSUMAB 60 MG/ML: 60 INJECTION SUBCUTANEOUS at 00:00

## 2022-03-16 ENCOUNTER — APPOINTMENT (OUTPATIENT)
Dept: ORTHOPEDIC SURGERY | Facility: CLINIC | Age: 62
End: 2022-03-16

## 2022-05-16 ENCOUNTER — APPOINTMENT (OUTPATIENT)
Dept: ORTHOPEDIC SURGERY | Facility: CLINIC | Age: 62
End: 2022-05-16

## 2022-06-06 ENCOUNTER — APPOINTMENT (OUTPATIENT)
Dept: ORTHOPEDIC SURGERY | Facility: CLINIC | Age: 62
End: 2022-06-06
Payer: COMMERCIAL

## 2022-06-06 DIAGNOSIS — Z98.890 OTHER SPECIFIED POSTPROCEDURAL STATES: ICD-10-CM

## 2022-06-06 PROCEDURE — 99214 OFFICE O/P EST MOD 30 MIN: CPT

## 2022-06-06 PROCEDURE — 72070 X-RAY EXAM THORAC SPINE 2VWS: CPT

## 2022-06-06 PROCEDURE — 72100 X-RAY EXAM L-S SPINE 2/3 VWS: CPT

## 2022-06-06 RX ORDER — CHLORHEXIDINE GLUCONATE 4 %
400 (240 MG) LIQUID (ML) TOPICAL
Qty: 30 | Refills: 0 | Status: ACTIVE | COMMUNITY
Start: 2021-12-14

## 2022-06-06 RX ORDER — ATORVASTATIN CALCIUM 10 MG/1
10 TABLET, FILM COATED ORAL
Qty: 90 | Refills: 0 | Status: ACTIVE | COMMUNITY
Start: 2022-04-06

## 2022-06-06 NOTE — REASON FOR VISIT
[Follow-Up Visit] : a follow-up visit for [FreeTextEntry2] : Thoracic and lumbar compression fractures.

## 2022-06-06 NOTE — PHYSICAL EXAM
[Stooped] : stooped [LE] : Sensory: Intact in bilateral lower extremities [DP] : dorsalis pedis 2+ and symmetric bilaterally [SLR] : negative straight leg raise [de-identified] : 5/5 EHL, ankle DF on left, 5/5 knee extension, hip flexion, plantar flexion\par  The surgical incision site(s) was healed, not erythematous, absent of discharge, not swollen and not dehisced in the lumbar kyphoplasty sites [de-identified] : thoracic kyphosis [de-identified] :  AP and lateral image of the thoracic spine obtained to compare with previous x-rays. Out of brace sternotomy wires noted. Cement is seen in the vertebral body at the T12 level. Superior plate compression seen on the T11 level which is stable when compared with prior x-rays and is the apex of the kyphotic deformity. Superior plate irregularity also seen at T10 which is stable. No obvious acute fractures identified.\par \par AP and lateral image of the lumbar spine demonstrate cement in the vertebral body at T12 and L3. Superior plate depression seen on the L2 and L1 vertebral bodies which are essentially unchanged from prior. Normal lumbar lordosis. Calcification abdominal aorta. Vascular clips throughout the abdomen. No obvious acute fractures noted. Bile stent noted is unchanged\par \par No interval change since 11/2021

## 2022-06-06 NOTE — DISCUSSION/SUMMARY
[Medication Risks Reviewed] : Medication risks reviewed [de-identified] : Given patient presents for routine follow-up.  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  He was not thrilled with physical therapy experience previously but would like to consider again.  Recommended water-based exercises if his transplant team approves.  Recommended stationary bicycle and elliptical as well as walking.  He has found gentle golf swings helpful but given his hernia which will require surgery he would like to hold off on that.  Overall he is not taking any significant medications for his back and pleased with the recovery thus far.  Recommended follow-up in 6 months for routine checkup.  He will continue his osteoporosis management and pain management as well.

## 2022-06-06 NOTE — HISTORY OF PRESENT ILLNESS
[Stable] : stable [Daily] : ~He/She~ states the symptoms seem to be occuring daily [Prolonged Sitting] : worsened by prolonged sitting [Prolonged Standing] : worsened by prolonged standing [de-identified] : Patient is here today for re evaluation on his thoracic and lumbar compression fractures. Overall feeling better. Patient is back to work 5 days a week, 5 hrs daily left foot drop no longer. Patient states he only takes tramadol when going for special testing.\par Prolonged standing or walking increases LBP. Can take tylenol prn for pain.\par Was hospitalized a couple of times since last visit 11/2021 for biliary stents. Had procedures during that time. [de-identified] : prolong walking

## 2022-06-08 ENCOUNTER — APPOINTMENT (OUTPATIENT)
Dept: ORTHOPEDIC SURGERY | Facility: CLINIC | Age: 62
End: 2022-06-08

## 2022-06-12 ENCOUNTER — RX RENEWAL (OUTPATIENT)
Age: 62
End: 2022-06-12

## 2022-06-21 NOTE — ASU PREOP CHECKLIST - NOTHING BY MOUTH SINCE
27-Aug-2021 12:00

## 2022-06-28 ENCOUNTER — NON-APPOINTMENT (OUTPATIENT)
Age: 62
End: 2022-06-28

## 2022-07-11 ENCOUNTER — NON-APPOINTMENT (OUTPATIENT)
Age: 62
End: 2022-07-11

## 2022-08-02 NOTE — ED PROVIDER NOTE - CROS ED GU ALL NEG
Render Risk Assessment In Note?: no
Comment: Ddx: follicular psoriasis
Detail Level: Simple
Comment: - Mejia stage-I with few draining nodules but no connecting sinuses; initial treatment with topical clindamycin and oral doxycycline
- - -

## 2022-08-08 ENCOUNTER — RX RENEWAL (OUTPATIENT)
Age: 62
End: 2022-08-08

## 2022-08-18 NOTE — HISTORY OF PRESENT ILLNESS
PAST SURGICAL HISTORY:  H/O breast augmentation     H/O hand surgery b/l with skin grafting    H/O skin graft Multiple    H/O:  section x3    S/P PICC central line placement 2013    Status post corneal transplant x2 right eye ,     Status post laser cataract surgery b/l with IOL implant     [___ Weeks Post Op] : [unfilled] weeks post op [7] : the patient reports pain that is 7/10 in severity [Slow Progress] : is progressing slowly [No Sign of Infection] : is showing no signs of infection [Adequate Pain Control] : has adequate pain control [de-identified] : Kyphoplasty 1/19/21  L2 fracture [de-identified] : Patient is here today for repeat xrays on his compression fractures. Patient did not go to pain management. Wearing TLSO  [de-identified] : Seen with his wife. Seen with a TLSO. Grossly intact light touch sensation in her bilateral lower extremities as well as strength testing 5 out of 5 EHL hip flexion knee extension ankle dorsiflexion plantarflexion. Tenderness over the anterior mid ribs on the left side. The surgical incision site(s) was clean, dry and intact, not erythematous, absent of discharge, not swollen and not dehisced. Additional findings included peripheral vascular exam normal and maneuvers demonstrated a negative Ayaka's sign.  [de-identified] : AP and lateral image of lumbar spine obtained today were compared with x-rays obtained previously.  Vascular clips noted in the abdomen anterior to the spine.  Calcification of abdominal aorta.  Cement is seen in the vertebral body at the L3 as well as T12 levels.  Previously noted L2 compression fracture is again seen with superior endplate compression deformity and loss of approximately 25% centrally which is unchanged from prior x-rays.  Questionable superior plate irregularity of the L4 level.  Compression deformity also of T11 with loss of vertebral body height anteriorly which is a new finding. [de-identified] : Recommended he follow with pain management for his pain symptoms.  Recommended continued use of a TLSO since he appears to have a new fracture at T11 questionably L4 since his last visit.  His L2 fracture appears stable.  Prescription of physical therapy with restrictions and lumbar spine exercise was provided today.  Prescription for quad cane was also provided.  He will start Tymlos versus Forteo for his osteoporosis.  He will continue following up with the GI team regarding elevated bilirubin.  I will see him back in 2 weeks for reevaluation with repeat x-rays 2 views thoracic and 2 views lumbar spine.\par \par The patient was educated regarding their condition, treatment options as well as prescribed course of treatment. \par Risks and benefits as well as alternatives to the proposed treatment were also provided to the patient \par They were given the opportunity to have all their questions answered to their satisfaction.\par \par Vital signs were reviewed with the patient and the patient was instructed to followup with their primary care provider for further management.\par \par Healthy lifestyle recommendations were also made including a tobacco free lifestyle, proper diet, and weight control.\par \par I spent 30 minutes of face-to-face time with the patient of which over 50% was spent in counseling the discussion above

## 2022-08-23 ENCOUNTER — APPOINTMENT (OUTPATIENT)
Dept: ENDOCRINOLOGY | Facility: CLINIC | Age: 62
End: 2022-08-23

## 2022-09-07 ENCOUNTER — APPOINTMENT (OUTPATIENT)
Dept: ENDOCRINOLOGY | Facility: CLINIC | Age: 62
End: 2022-09-07

## 2022-09-07 PROCEDURE — 96401 CHEMO ANTI-NEOPL SQ/IM: CPT

## 2022-09-07 RX ORDER — DENOSUMAB 60 MG/ML
60 INJECTION SUBCUTANEOUS
Qty: 1 | Refills: 0 | Status: COMPLETED | OUTPATIENT
Start: 2022-09-01

## 2022-09-28 ENCOUNTER — APPOINTMENT (OUTPATIENT)
Dept: OTOLARYNGOLOGY | Facility: CLINIC | Age: 62
End: 2022-09-28

## 2022-09-28 VITALS
WEIGHT: 150 LBS | DIASTOLIC BLOOD PRESSURE: 87 MMHG | SYSTOLIC BLOOD PRESSURE: 145 MMHG | HEIGHT: 64 IN | BODY MASS INDEX: 25.61 KG/M2 | HEART RATE: 65 BPM

## 2022-09-28 DIAGNOSIS — M48.50XA COLLAPSED VERTEBRA, NOT ELSEWHERE CLASSIFIED, SITE UNSPECIFIED, INITIAL ENCOUNTER FOR FRACTURE: ICD-10-CM

## 2022-09-28 PROCEDURE — 92550 TYMPANOMETRY & REFLEX THRESH: CPT

## 2022-09-28 PROCEDURE — 99204 OFFICE O/P NEW MOD 45 MIN: CPT | Mod: 25

## 2022-09-28 PROCEDURE — 92557 COMPREHENSIVE HEARING TEST: CPT

## 2022-09-28 RX ORDER — TACROLIMUS 0.5 MG/1
0.5 CAPSULE ORAL
Qty: 60 | Refills: 0 | Status: DISCONTINUED | COMMUNITY
Start: 2021-12-06 | End: 2022-09-28

## 2022-09-28 RX ORDER — VALACYCLOVIR 500 MG/1
500 TABLET, FILM COATED ORAL
Qty: 60 | Refills: 0 | Status: DISCONTINUED | COMMUNITY
Start: 2021-09-29 | End: 2022-09-28

## 2022-09-28 RX ORDER — DOXYCYCLINE 100 MG/1
100 CAPSULE ORAL
Qty: 28 | Refills: 0 | Status: DISCONTINUED | COMMUNITY
Start: 2022-01-27 | End: 2022-09-28

## 2022-09-28 RX ORDER — CEPHALEXIN 500 MG/1
500 CAPSULE ORAL
Qty: 28 | Refills: 0 | Status: DISCONTINUED | COMMUNITY
Start: 2022-04-27 | End: 2022-09-28

## 2022-09-28 RX ORDER — AZITHROMYCIN 250 MG/1
250 TABLET, FILM COATED ORAL
Qty: 6 | Refills: 0 | Status: DISCONTINUED | COMMUNITY
Start: 2022-01-05 | End: 2022-09-28

## 2022-09-28 RX ORDER — DOXYCYCLINE HYCLATE 100 MG/1
100 CAPSULE ORAL
Qty: 28 | Refills: 0 | Status: DISCONTINUED | COMMUNITY
Start: 2022-01-27 | End: 2022-09-28

## 2022-09-28 RX ORDER — CIPROFLOXACIN HYDROCHLORIDE 500 MG/1
500 TABLET, FILM COATED ORAL
Qty: 6 | Refills: 0 | Status: DISCONTINUED | COMMUNITY
Start: 2021-12-10 | End: 2022-09-28

## 2022-09-28 RX ORDER — AMOXICILLIN AND CLAVULANATE POTASSIUM 875; 125 MG/1; MG/1
875-125 TABLET, COATED ORAL
Qty: 28 | Refills: 0 | Status: DISCONTINUED | COMMUNITY
Start: 2022-01-25 | End: 2022-09-28

## 2022-09-28 NOTE — CONSULT LETTER
[Dear  ___] : Dear  [unfilled], [Consult Letter:] : I had the pleasure of evaluating your patient, [unfilled]. [Please see my note below.] : Please see my note below. [Consult Closing:] : Thank you very much for allowing me to participate in the care of this patient.  If you have any questions, please do not hesitate to contact me. [Sincerely,] : Sincerely, [FreeTextEntry1] : Eliezer Honeycutt MD FACS

## 2022-09-28 NOTE — HISTORY OF PRESENT ILLNESS
[de-identified] : Patient presents stating that he is having a hard time hearing. He states he has had mild tinnitus for 20 years but it is becoming worst. He states slowly things are starting to sound muffled. He states he prefers to have the captioning on when he watches TV. He denies family history of hearing loss. He states in his late 20's he went to a shooting range and only wore ear plugs rather than the big headphones. Patient has had history of liver transplant in November 2020. He is diagnosed with cryptogenic cirrhosis.

## 2022-09-28 NOTE — ASSESSMENT
[FreeTextEntry1] : Reviewed and reconciled medications, allergies, PMHx, PSHx, SocHx, FMHx Patient presents stating that he is having a hard time hearing. He states he has had mild tinnitus for 20 years but it is becoming worst. He states slowly things are starting to sound muffled. He states he prefers to have the captioning on when he watches TV. He denies family history of hearing loss. He states in his late 20's he went to a shooting range and only wore ear plugs rather than the big headphones.  Patient has had history of liver transplant in November 2020. He is diagnosed with cryptogenic cirrhosis. \par \par Physical Exam:\par -Right Ear: cerumen impaction removed via curettage \par -Left Ear: cerumen removed via curettage \par -tuning fork fades really early\par -deviated septum to the right\par -Geographic tongue. Getting b/l ulcerations from teeth\par \par Audio:\par - TYPE A TYMPS AU (ETF WNL IN RIGHT, ABNORMAL IN LEFT)\par MILD SLOPING TO SEVERE SNHL, AU \par RIGHT ASYMM 1000-2000HZ; POOR WRS AU, RIGHT POORER\par 12% 90DB right side\par 28% 75 DB left side\par \par Spoke to wife on the phone discussing results at the request of  patient. \par \par \par Plan: cerumen impaction removed via curettage. Audio - results interpreted by Dr. Honeycutt and reviewed with the patient. \par MRI and IACS w/wo contrast  to be done at [facility] as hearing loss profound and to assess retrocochlear \par Try hearing aids if not helpful attempt cochlear implant\par Follow up after MRI

## 2022-09-28 NOTE — DATA REVIEWED
[de-identified] : - TYPE A TYMPS AU (ETF WNL IN RIGHT, ABNORMAL IN LEFT)\par MILD SLOPING TO SEVERE SNHL, AU \par RIGHT ASYMM 1000-2000HZ; POOR WRS AU, RIGHT POORER

## 2022-09-28 NOTE — PHYSICAL EXAM
[Hearing Stark Test (Tuning Fork On Forehead)] : no lateralization of tone [] : septum deviated to the right [Midline] : trachea located in midline position [Normal] : no rashes [FreeTextEntry8] : cerumen impaction removed via curettage  [FreeTextEntry9] : cerumen removed via curettage  [FreeTextEntry5] : fades away real quick [de-identified] : geographic tongue. Getting b/l ulcerations from teeth [FreeTextEntry2] : sensations intact. sinuses nontender to percussion

## 2022-10-06 ENCOUNTER — APPOINTMENT (OUTPATIENT)
Dept: MRI IMAGING | Facility: CLINIC | Age: 62
End: 2022-10-06

## 2022-10-06 ENCOUNTER — OUTPATIENT (OUTPATIENT)
Dept: OUTPATIENT SERVICES | Facility: HOSPITAL | Age: 62
LOS: 1 days | End: 2022-10-06
Payer: COMMERCIAL

## 2022-10-06 DIAGNOSIS — Z94.4 LIVER TRANSPLANT STATUS: Chronic | ICD-10-CM

## 2022-10-06 DIAGNOSIS — Z95.1 PRESENCE OF AORTOCORONARY BYPASS GRAFT: Chronic | ICD-10-CM

## 2022-10-06 DIAGNOSIS — Z98.890 OTHER SPECIFIED POSTPROCEDURAL STATES: Chronic | ICD-10-CM

## 2022-10-06 DIAGNOSIS — H90.3 SENSORINEURAL HEARING LOSS, BILATERAL: ICD-10-CM

## 2022-10-06 DIAGNOSIS — Z00.8 ENCOUNTER FOR OTHER GENERAL EXAMINATION: ICD-10-CM

## 2022-10-06 PROCEDURE — 70553 MRI BRAIN STEM W/O & W/DYE: CPT | Mod: 26

## 2022-10-06 PROCEDURE — A9585: CPT

## 2022-10-06 PROCEDURE — 70553 MRI BRAIN STEM W/O & W/DYE: CPT

## 2022-10-14 ENCOUNTER — APPOINTMENT (OUTPATIENT)
Dept: OTOLARYNGOLOGY | Facility: CLINIC | Age: 62
End: 2022-10-14

## 2022-10-14 VITALS
HEIGHT: 64 IN | HEART RATE: 80 BPM | DIASTOLIC BLOOD PRESSURE: 81 MMHG | WEIGHT: 150 LBS | SYSTOLIC BLOOD PRESSURE: 150 MMHG | BODY MASS INDEX: 25.61 KG/M2

## 2022-10-14 VITALS — SYSTOLIC BLOOD PRESSURE: 134 MMHG | HEART RATE: 68 BPM | DIASTOLIC BLOOD PRESSURE: 76 MMHG

## 2022-10-14 DIAGNOSIS — H93.13 TINNITUS, BILATERAL: ICD-10-CM

## 2022-10-14 DIAGNOSIS — H90.3 SENSORINEURAL HEARING LOSS, BILATERAL: ICD-10-CM

## 2022-10-14 PROCEDURE — 99213 OFFICE O/P EST LOW 20 MIN: CPT

## 2022-10-14 NOTE — ASSESSMENT
[FreeTextEntry1] : Reviewed and reconciled medications, allergies, PMHx, PSHx, SocHx, FMHx \par \par Patient with h/o HL presents today for his MRI  brain and IAC results.\par \par MRI 10/06/22:\par -normal MRI of brain and IAC\par -sinuses and mastoids are normal\par \par Plan: Needs hearing aids. Make an appointment to see one of our audiologist.  More than 50% of visit spent in discussion.

## 2022-10-14 NOTE — CONSULT LETTER
[Dear  ___] : Dear  [unfilled], [Courtesy Letter:] : I had the pleasure of seeing your patient, [unfilled], in my office today. [Please see my note below.] : Please see my note below. [Consult Closing:] : Thank you very much for allowing me to participate in the care of this patient.  If you have any questions, please do not hesitate to contact me. [Sincerely,] : Sincerely, [FreeTextEntry1] : Eliezer Honeycutt MD FACS

## 2022-10-31 ENCOUNTER — MED ADMIN CHARGE (OUTPATIENT)
Age: 62
End: 2022-10-31

## 2022-11-01 ENCOUNTER — APPOINTMENT (OUTPATIENT)
Dept: INTERNAL MEDICINE | Facility: CLINIC | Age: 62
End: 2022-11-01

## 2022-11-01 VITALS
HEART RATE: 68 BPM | TEMPERATURE: 97.2 F | OXYGEN SATURATION: 98 % | HEIGHT: 64 IN | SYSTOLIC BLOOD PRESSURE: 138 MMHG | DIASTOLIC BLOOD PRESSURE: 80 MMHG | RESPIRATION RATE: 16 BRPM | BODY MASS INDEX: 25.61 KG/M2 | WEIGHT: 150 LBS

## 2022-11-01 DIAGNOSIS — E55.9 VITAMIN D DEFICIENCY, UNSPECIFIED: ICD-10-CM

## 2022-11-01 DIAGNOSIS — K21.9 GASTRO-ESOPHAGEAL REFLUX DISEASE W/OUT ESOPHAGITIS: ICD-10-CM

## 2022-11-01 DIAGNOSIS — H61.21 IMPACTED CERUMEN, RIGHT EAR: ICD-10-CM

## 2022-11-01 DIAGNOSIS — K74.60 UNSPECIFIED CIRRHOSIS OF LIVER: ICD-10-CM

## 2022-11-01 PROCEDURE — G0008: CPT

## 2022-11-01 PROCEDURE — 99214 OFFICE O/P EST MOD 30 MIN: CPT | Mod: 25

## 2022-11-01 PROCEDURE — 90662 IIV NO PRSV INCREASED AG IM: CPT

## 2022-11-01 RX ORDER — FOLIC ACID/VIT B COMPLEX AND C 0.8 MG
TABLET ORAL
Qty: 30 | Refills: 0 | Status: DISCONTINUED | COMMUNITY
Start: 2021-04-13 | End: 2022-11-01

## 2022-11-01 RX ORDER — ABALOPARATIDE 2000 UG/ML
3120 INJECTION, SOLUTION SUBCUTANEOUS
Qty: 3 | Refills: 1 | Status: DISCONTINUED | COMMUNITY
Start: 2021-01-27 | End: 2022-11-01

## 2022-11-01 RX ORDER — EVEROLIMUS 0.25 MG/1
0.25 TABLET ORAL
Qty: 60 | Refills: 0 | Status: DISCONTINUED | COMMUNITY
Start: 2022-04-14 | End: 2022-11-01

## 2022-11-01 RX ORDER — TERIPARATIDE 250 UG/ML
620 INJECTION, SOLUTION SUBCUTANEOUS
Qty: 3 | Refills: 1 | Status: DISCONTINUED | COMMUNITY
Start: 2021-06-15 | End: 2022-11-01

## 2022-11-01 RX ORDER — MUPIROCIN 20 MG/G
2 OINTMENT TOPICAL
Qty: 22 | Refills: 0 | Status: DISCONTINUED | COMMUNITY
Start: 2021-05-25 | End: 2022-11-01

## 2022-11-01 RX ORDER — VIT B COMP NO.3/FOLIC/C/BIOTIN 1 MG-60 MG
1 TABLET ORAL
Qty: 30 | Refills: 0 | Status: DISCONTINUED | COMMUNITY
Start: 2020-12-26 | End: 2022-11-01

## 2022-11-01 RX ORDER — EVEROLIMUS 0.75 MG/1
0.75 TABLET ORAL
Qty: 60 | Refills: 0 | Status: DISCONTINUED | COMMUNITY
Start: 2022-01-30 | End: 2022-11-01

## 2022-11-01 RX ORDER — MYCOPHENOLATE MOFETIL 250 MG/1
CAPSULE ORAL
Refills: 0 | Status: DISCONTINUED | COMMUNITY
Start: 2021-01-29 | End: 2022-11-01

## 2022-11-01 RX ORDER — EVEROLIMUS 0.5 MG/1
0.5 TABLET ORAL
Qty: 180 | Refills: 0 | Status: DISCONTINUED | COMMUNITY
Start: 2022-02-15 | End: 2022-11-01

## 2022-11-01 NOTE — PHYSICAL EXAM
[No Acute Distress] : no acute distress [Well Nourished] : well nourished [Well Developed] : well developed [Well-Appearing] : well-appearing [Normal Sclera/Conjunctiva] : normal sclera/conjunctiva [PERRL] : pupils equal round and reactive to light [EOMI] : extraocular movements intact [Normal Outer Ear/Nose] : the outer ears and nose were normal in appearance [Normal Oropharynx] : the oropharynx was normal [No JVD] : no jugular venous distention [No Lymphadenopathy] : no lymphadenopathy [Supple] : supple [Thyroid Normal, No Nodules] : the thyroid was normal and there were no nodules present [No Respiratory Distress] : no respiratory distress  [No Accessory Muscle Use] : no accessory muscle use [Clear to Auscultation] : lungs were clear to auscultation bilaterally [Normal Rate] : normal rate  [Regular Rhythm] : with a regular rhythm [Normal S1, S2] : normal S1 and S2 [No Murmur] : no murmur heard [No Carotid Bruits] : no carotid bruits [No Abdominal Bruit] : a ~M bruit was not heard ~T in the abdomen [No Varicosities] : no varicosities [Pedal Pulses Present] : the pedal pulses are present [No Edema] : there was no peripheral edema [No Palpable Aorta] : no palpable aorta [No Extremity Clubbing/Cyanosis] : no extremity clubbing/cyanosis [Soft] : abdomen soft [Non Tender] : non-tender [Non-distended] : non-distended [No Masses] : no abdominal mass palpated [No HSM] : no HSM [Normal Bowel Sounds] : normal bowel sounds [Normal Posterior Cervical Nodes] : no posterior cervical lymphadenopathy [Normal Anterior Cervical Nodes] : no anterior cervical lymphadenopathy [No CVA Tenderness] : no CVA  tenderness [No Spinal Tenderness] : no spinal tenderness [No Joint Swelling] : no joint swelling [Grossly Normal Strength/Tone] : grossly normal strength/tone [No Rash] : no rash [Coordination Grossly Intact] : coordination grossly intact [No Focal Deficits] : no focal deficits [Normal Gait] : normal gait [Deep Tendon Reflexes (DTR)] : deep tendon reflexes were 2+ and symmetric [Normal Affect] : the affect was normal [Normal Insight/Judgement] : insight and judgment were intact [Normal Voice/Communication] : normal voice/communication [Normal TMs] : both tympanic membranes were normal [Normal Nasal Mucosa] : the nasal mucosa was normal [No Hernias] : no hernias [Normal Supraclavicular Nodes] : no supraclavicular lymphadenopathy [Kyphosis] : kyphosis [Scoliosis] : scoliosis [Acne] : no acne [Speech Grossly Normal] : speech grossly normal [Memory Grossly Normal] : memory grossly normal [Alert and Oriented x3] : oriented to person, place, and time [Normal Mood] : the mood was normal

## 2022-11-01 NOTE — PLAN
[FreeTextEntry1] : Immunization\par flu vaccine - Fluzone Quadrivalent 0.5mL x 1 administered intramuscularly to left deltoid \par ENT\par decreased hearing - patient to be fitted for hearing aids; continue to follow up with ENT specialist, Dr. Honeycutt\par Cardiology\par arteriosclerosis of coronary artery - s/p CABG - continue Aspirin 81mg p.o.q.d. as directed; continue Atorvastatin Calcium 10mg p.o.q.d. as directed - continue to follow up with cardiologist, Dr. Quan\par Gastroenterology\par cirrhotic liver - s/p transplantation - off Mycophenolate Mofetil and Everolimus; continue Tacrolimus (Prograf) 1mg in AM and 0.5mg in PM p.o.q.d. as directed - continue to follow up hepatologist, Dr. Dinh\par GERD - continue Pantoprazole Sodium 40mg p.o.q.d. - continue antireflux measures\par Endocrinology/Musculoskeletal\par osteoporosis - off Tymlos; continue Prolia injection q. 6 months at endocrinologist, Dr. Angel's office; continue Calcium/Vitamin D; continue weight-bearing exercise  \par Musculoskeletal\par L3/T12 fracture - s/p kyphoplasty; s/p PT - continue to follow up with orthopedic surgeon, Dr. Espana  \par Musculoskeletal/Gastroenterology\par S/p ventral hernia repair - follow up with surgeon as necessary\par Infectious Disease\par EBV Infection - continue Valacyclovir HCl 500mg BID p.o.q.d. as directed

## 2022-11-01 NOTE — HISTORY OF PRESENT ILLNESS
[FreeTextEntry1] : flu vaccine and general follow-up [de-identified] : Patient is a 62 year old male with a past medical history as below who presents for flu vaccine and general follow-up.\par \par Patient states he is taking all medications as prescribed and denies any adverse reactions or side effects. He denies any new arthralgias or myalgias on Atorvastatin Calcium. GERD has been well-managed on Pantoprazole Sodium.\par \par Patient sees hepatologist, Dr. Dinh given history of cirrhotic liver, s/p transplantation. He was taken off Everolimus 2 months ago. He is also off Mycophenolate Mofetil. Recent blood work revealed low WBC, low HGB, and normal LFTs.  \par \par Patient is s/p ventral hernia repair (approximately 9 weeks ago). He had been on Tramadol for post-operative pain. He has been off the pain medication for 6 weeks.\par \par Patient sees endocrinologist, Dr. Angel given history of osteoporosis; currently on Prolia.\par \par Patient notes recently seeing ENT specialist, Dr. Honeycutt regarding decreased hearing. MRI Brain [facility] IC dated 10/6/22 was unremarkable. Patient will reportedly be fitted for hearing aids on 11/4/22. \par \par Patient notes increasing CV activity (walking daily, golf). His gait has been improving.

## 2022-11-01 NOTE — ASSESSMENT
[FreeTextEntry1] : Patient is a 62 year old male with a past medical history as above who presents for flu vaccine and general follow-up.

## 2022-11-01 NOTE — ADDENDUM
[FreeTextEntry1] : I, Janes Shah, acted solely as scribe for Dr. Portillo Mckeon DO on this date 11/01/2022  3:50PM .\par \par All medical record entries made by the Scribe were at my, Dr. Portillo Mckeon DO direction and personally dictated by me on 11/01/2022  3:50PM. I have reviewed the chart and agree that the record accurately reflects my personal performance of the history, physical exam, assessment and plan. I have also personally directed, reviewed and agreed with the chart.

## 2022-11-04 ENCOUNTER — APPOINTMENT (OUTPATIENT)
Dept: PHARMACY | Facility: CLINIC | Age: 62
End: 2022-11-04

## 2022-11-04 PROCEDURE — V5010 ASSESSMENT FOR HEARING AID: CPT | Mod: NC

## 2022-12-14 ENCOUNTER — APPOINTMENT (OUTPATIENT)
Dept: ORTHOPEDIC SURGERY | Facility: CLINIC | Age: 62
End: 2022-12-14

## 2022-12-14 VITALS — SYSTOLIC BLOOD PRESSURE: 132 MMHG | HEART RATE: 76 BPM | DIASTOLIC BLOOD PRESSURE: 82 MMHG

## 2022-12-14 DIAGNOSIS — Z87.39 PERSONAL HISTORY OF OTHER DISEASES OF THE MUSCULOSKELETAL SYSTEM AND CONNECTIVE TISSUE: ICD-10-CM

## 2022-12-14 DIAGNOSIS — M50.30 OTHER CERVICAL DISC DEGENERATION, UNSPECIFIED CERVICAL REGION: ICD-10-CM

## 2022-12-14 PROCEDURE — 72050 X-RAY EXAM NECK SPINE 4/5VWS: CPT

## 2022-12-14 PROCEDURE — 99213 OFFICE O/P EST LOW 20 MIN: CPT

## 2023-01-22 ENCOUNTER — RX RENEWAL (OUTPATIENT)
Age: 63
End: 2023-01-22

## 2023-01-27 ENCOUNTER — RX RENEWAL (OUTPATIENT)
Age: 63
End: 2023-01-27

## 2023-02-01 NOTE — RETURN TO WORK/SCHOOL
[FreeTextEntry1] : Patient may return to work on Monday 4/19/21 no restrictions. Erythromycin Counseling:  I discussed with the patient the risks of erythromycin including but not limited to GI upset, allergic reaction, drug rash, diarrhea, increase in liver enzymes, and yeast infections.

## 2023-02-13 ENCOUNTER — APPOINTMENT (OUTPATIENT)
Dept: ENDOCRINOLOGY | Facility: CLINIC | Age: 63
End: 2023-02-13

## 2023-03-13 ENCOUNTER — APPOINTMENT (OUTPATIENT)
Dept: ENDOCRINOLOGY | Facility: CLINIC | Age: 63
End: 2023-03-13
Payer: COMMERCIAL

## 2023-03-13 VITALS — DIASTOLIC BLOOD PRESSURE: 80 MMHG | SYSTOLIC BLOOD PRESSURE: 130 MMHG | HEART RATE: 66 BPM | OXYGEN SATURATION: 99 %

## 2023-03-13 VITALS — HEIGHT: 63 IN | BODY MASS INDEX: 28.88 KG/M2 | WEIGHT: 163 LBS

## 2023-03-13 DIAGNOSIS — M40.209 UNSPECIFIED KYPHOSIS, SITE UNSPECIFIED: ICD-10-CM

## 2023-03-13 PROCEDURE — 96401 CHEMO ANTI-NEOPL SQ/IM: CPT

## 2023-03-13 PROCEDURE — ZZZZZ: CPT

## 2023-03-13 PROCEDURE — 99214 OFFICE O/P EST MOD 30 MIN: CPT | Mod: 25

## 2023-03-13 RX ORDER — DENOSUMAB 60 MG/ML
60 INJECTION SUBCUTANEOUS
Qty: 1 | Refills: 0 | Status: COMPLETED | OUTPATIENT
Start: 2023-03-13

## 2023-03-13 RX ADMIN — DENOSUMAB 60 MG/ML: 60 INJECTION SUBCUTANEOUS at 00:00

## 2023-03-14 PROBLEM — M40.209 KYPHOSIS: Status: ACTIVE | Noted: 2021-03-09

## 2023-03-14 NOTE — PROCEDURE
[FreeTextEntry1] : \par Bone mineral density March 13, 2023\par Comparison to 2022\par Spine not performed\par Total hip -3.1 osteoporosis no significant change\par  femoral neck -3.5 osteoporosis no significant \par proximal radius -5.1 osteoporosis +4.3\par \par Bone mineral density: 01/31/2022 \par Indication: vs. 2021\par Spine: (L4 only) -2.7 osteoporosis, prior -3.2 osteoporosis\par Total hip: -3.2 osteoporosis, -7.6%\par Femoral neck: -3.7 osteoporosis, -8.4%\par Proximal radius: -5.5 osteoporosis, no significant change\par \par Bone mineral density January 11, 2021\par Indication recent compression fractures new chronic steroids.\par Compared to outside study 2019\par Spine excluding compression L3 -3.4 osteoporosis prior -2.7\par Total hip -2.9 osteoporosis prior -2.3\par Femoral neck -3.4 osteoporosis prior \par Proximal radius -5.3, severe osteoporosis no prior -2.7

## 2023-03-14 NOTE — HISTORY OF PRESENT ILLNESS
[Abaloparatide (Tymlos)] : Abaloparatide [Teriparatide (Forteo)] : Teriparatide [Taking Steroids] : a history of taking steroids [Previous Fragility Fracture] : previous fragility fracture(s) [FreeTextEntry1] : No interval fractures or change in medications.\par Had successful repair of hiatal hernia September 2022.  Overall feeling well.  Has had intentional weight loss although regained some weight in preparation for surgical procedure.\par Pt started Tymlos 1/2021. Took correctly but had headaches and back pains for half hour after injection. Transitioned to Forteo 6/2021. Taken correctly, tolerated well. No blood Ca elevation, lightheadedness, palpitations, or dizziness. Back pain significantly improved, now has minimal back pain. Patient is now working mostly full time, can do moderate social activities like golfing.\par \par MD, geriatrician, complicated hx developed cryptogenic cirrhosis eventually leading to liver transplantation November 2020.  Previously patient had history of coronary artery disease status post bypass surgery December 2019. Long history of kidney stones last passed 2019. The patient recently developed severe back pain has been found to have T12 and L3 compression fractures. Underwent kyphoplasty with only.moderate pain relief. \par \par The patient has no previous history of significant bone disease. He did have previous metatarsal fractures and a traumatic radius fracture. The patient has been on prednisone as part of the transplant therapy since 11/2020 now decreased to 5 mg. BMD 9/2019 was moderately low. Spine -2.7 total hip -2.3 femoral neck -2.7. The patient did not begin medical osteoporosis therapy at that time. Pt had kyphoplasty 1/2021 w/ some relief in pain. BMD 1/2021 shows progressive worsening osteoporosis spine and hip versus outside study 2019 as well as severely low proximal radius.\par \par 7/2021 he had CMV, Colitis, then EBV, treated w/ Valtrex.\par 8/2021 was admitted at hospital due pancreatitis which resolved on own.\par 9/2021 he had ERCP due to elevated bili, had stricture resulting in metal stent being placed, but failed, had 2 replacement stents inserted.\par 12/2021 he had all stents removed.\par 12/2021 had mild COVID. No hospitalization. No residual symptoms.\par Currently doxycycline for cellulitis on right leg.

## 2023-03-14 NOTE — PHYSICAL EXAM
[Alert] : alert [Normal Sclera/Conjunctiva] : normal sclera/conjunctiva [EOMI] : extra ocular movement intact [No Proptosis] : no proptosis [Thyroid Not Enlarged] : the thyroid was not enlarged [No Thyroid Nodules] : no palpable thyroid nodules [Clear to Auscultation] : lungs were clear to auscultation bilaterally [Normal S1, S2] : normal S1 and S2 [Normal Rate] : heart rate was normal [Regular Rhythm] : with a regular rhythm [No Edema] : no peripheral edema [Normal Bowel Sounds] : normal bowel sounds [Not Tender] : non-tender [Not Distended] : not distended [Soft] : abdomen soft [Normal Anterior Cervical Nodes] : no anterior cervical lymphadenopathy [No Spinal Tenderness] : no spinal tenderness [Kyphosis] : kyphosis present [No Stigmata of Cushings Syndrome] : no stigmata of Cushings Syndrome [Normal Reflexes] : deep tendon reflexes were 2+ and symmetric [Oriented x3] : oriented to person, place, and time [de-identified] : anicteric [de-identified] : Positive sternotomy scar.  Firm nodules presumably previous sternotomy wires present. [de-identified] : severe [de-identified] : ambulates w/ cane [de-identified] : Mild erythema right lower extremity

## 2023-03-14 NOTE — ASSESSMENT
[Denosumab Therapy] : Risks  and benefits of denosumab therapy were discussed with the patient including eczema, cellulitis, osteonecrosis of the jaw and atypical femur fractures [FreeTextEntry1] : Complicated 62 year-old male presents with progressive osteoporosis and fractures. BMD 9/2019 was moderately low but patient did not begin medical therapy for osteoporosis. The patient does have a previous history of apparently calcium oxalate kidney stones.\par \par Medical history is notable for recent liver transplantation for cryptogenic cirrhosis. Patient is on steroids as part of transplant treatment. He had L3 and severe T12 compression fractures with fairly severe pain. Pt had kyphoplasty 1/2021 w/ some relief in pain. BMD 1/2021 showed progressive worsening osteoporosis spine and hip versus outside study 2019 as well as severely low proximal radius. The patient is at extremely high risk for future fractures. Options of medical therapy were discussed in great detail with patient and spouse. Pt started Tymlos 1/2021. Taken correctly but he reported headaches and back pains for half hour after injection. Transitioned to Forteo 6/2021. Taken correctly, tolerated well. No blood Ca elevation, lightheadedness, palpitations, or dizziness. BMD 1/2022 indicates improved osteoporosis in spine, worsened low osteoporosis in hip, and severely low osteoporosis in proximal radius. BMD results reviewed w/ pt. \par \par Began Prolia February 2022 after Forteo.  Tolerating well\par Repeat bone density 2023 stabilized and increased numerically although of borderline statistical significance.  Certainly stopped the further progressive bone loss.\par Back pain much improved, now has minimal pain. Patient is now working mostly full time, can do moderate social activities like golfing.\par \par Marked kyphosis, clinically stable on physical examination.\par \par H/o Vitamin D Deficiency. Continue Vitamin D 12715 IU weekly.\par

## 2023-05-09 ENCOUNTER — RX RENEWAL (OUTPATIENT)
Age: 63
End: 2023-05-09

## 2023-05-09 RX ORDER — DENOSUMAB 60 MG/ML
60 INJECTION SUBCUTANEOUS
Qty: 1 | Refills: 1 | Status: ACTIVE | COMMUNITY
Start: 2022-02-08 | End: 1900-01-01

## 2023-05-15 ENCOUNTER — APPOINTMENT (OUTPATIENT)
Dept: INTERNAL MEDICINE | Facility: CLINIC | Age: 63
End: 2023-05-15

## 2023-06-07 ENCOUNTER — APPOINTMENT (OUTPATIENT)
Dept: ORTHOPEDIC SURGERY | Facility: CLINIC | Age: 63
End: 2023-06-07

## 2023-06-14 ENCOUNTER — APPOINTMENT (OUTPATIENT)
Dept: ORTHOPEDIC SURGERY | Facility: CLINIC | Age: 63
End: 2023-06-14
Payer: COMMERCIAL

## 2023-06-14 ENCOUNTER — NON-APPOINTMENT (OUTPATIENT)
Age: 63
End: 2023-06-14

## 2023-06-14 VITALS
HEIGHT: 63 IN | HEART RATE: 78 BPM | SYSTOLIC BLOOD PRESSURE: 124 MMHG | DIASTOLIC BLOOD PRESSURE: 79 MMHG | BODY MASS INDEX: 29.23 KG/M2 | WEIGHT: 165 LBS

## 2023-06-14 DIAGNOSIS — M84.48XS PATHOLOGICAL FRACTURE, OTHER SITE, SEQUELA: ICD-10-CM

## 2023-06-14 DIAGNOSIS — M48.54XS COLLAPSED VERTEBRA, NOT ELSEWHERE CLASSIFIED, THORACIC REGION, SEQUELA OF FRACTURE: ICD-10-CM

## 2023-06-14 PROCEDURE — 72110 X-RAY EXAM L-2 SPINE 4/>VWS: CPT

## 2023-06-14 PROCEDURE — 99213 OFFICE O/P EST LOW 20 MIN: CPT

## 2023-06-14 NOTE — HISTORY OF PRESENT ILLNESS
[6] : a current pain level of 6/10 [Daily] : ~He/She~ states the symptoms seem to be occuring daily [Prolonged Standing] : worsened by prolonged standing [Acetaminophen] : relieved by acetaminophen [de-identified] : Patient is here today for re evaluation on his cervical and lumbar spine. Patient states last week for testing lying down for awhile felt couple of pops in his low back would like xrays today.\par 1 month ago had MRI of liver, felt 2 pops in his back when laying down and also in the machine once. No pain then\par 2 weeks ago had severe abdominal pain, obstruction of bowel had NG tube- decompressed with NG tube - had another MRI and felt a pop in his back\par Continues to go to the gym , no problem, light weight with bands, treadmill, elliptical, light weight arm raises\par recently started on prednisone 5 mg a day a month ago, and then slow taper monthly for hemolytic anemia and liver insufficiency\par Is on prolia for osteoporosis [de-identified] : lying down

## 2023-06-14 NOTE — REASON FOR VISIT
[Follow-Up Visit] : a follow-up visit for [Degenerative Joint Disease] : degenerative joint disease [Neck Pain] : neck pain [FreeTextEntry2] : Kyphosis

## 2023-06-14 NOTE — PHYSICAL EXAM
[Stooped] : stooped [LE] : Sensory: Intact in bilateral lower extremities [DP] : dorsalis pedis 2+ and symmetric bilaterally [SLR] : negative straight leg raise [de-identified] : 5/5 EHL, ankle DF on left, 5/5 knee extension, hip flexion, plantar flexion\par  The surgical incision site(s) was healed, not erythematous, absent of discharge, not swollen and not dehisced in the lumbar kyphoplasty sites [de-identified] : thoracic kyphosis [de-identified] : 4 views of the lumbar spine demonstrate cement in the vertebral body at T12 and L3. Superior plate depression seen on the L2 and L1 vertebral bodies which are essentially unchanged from prior. Normal lumbar lordosis. Calcification abdominal aorta. Vascular clips throughout the abdomen. No obvious acute fractures noted. Bile stent noted is unchanged\par \par No interval change since 6/2022

## 2023-06-14 NOTE — DISCUSSION/SUMMARY
[Medication Risks Reviewed] : Medication risks reviewed [de-identified] : Patient presents for routine follow-up.  He reported feeling some pops in his back when laying down for MRI on 2 separate occasions recently without any pain.  On evaluation today clinically and radiographically he does not appear to have any acute findings and he continues to work as well as exercise 5 days a week.  He appears to be cautious with his exercise and that is reasonable for him to continue.  A referral to a rehab physician was provided as well as physical therapy was again prescribed for him.  He will see me back in 6 to 12 months on as-needed basis.\par \par He is not on Prolia for osteoporosis and continues to see endocrinologist.  The questionable he also has bowel obstruction for which she may need surgical intervention and that is being followed by his gastroenterology service.  He has a history of a liver transplant

## 2023-07-26 ENCOUNTER — APPOINTMENT (OUTPATIENT)
Dept: ORTHOPEDIC SURGERY | Facility: CLINIC | Age: 63
End: 2023-07-26
Payer: COMMERCIAL

## 2023-07-26 VITALS — DIASTOLIC BLOOD PRESSURE: 86 MMHG | HEART RATE: 75 BPM | HEIGHT: 63 IN | SYSTOLIC BLOOD PRESSURE: 127 MMHG

## 2023-07-26 DIAGNOSIS — S86.812A STRAIN OF OTHER MUSCLE(S) AND TENDON(S) AT LOWER LEG LEVEL, LEFT LEG, INITIAL ENCOUNTER: ICD-10-CM

## 2023-07-26 PROCEDURE — 99213 OFFICE O/P EST LOW 20 MIN: CPT

## 2023-07-26 RX ORDER — METHOCARBAMOL 500 MG/1
500 TABLET, FILM COATED ORAL 3 TIMES DAILY
Qty: 30 | Refills: 0 | Status: ACTIVE | COMMUNITY
Start: 2023-07-26 | End: 1900-01-01

## 2023-07-26 NOTE — DISCUSSION/SUMMARY
[Medication Risks Reviewed] : Medication risks reviewed [de-identified] : Based on the patient's history and clinical evaluation he appears to have a calf strain with partial tearing of the gastroc/soleus the musculotendinous junction.  His Achilles tendon appears to be intact.  At this time recommended activity modification and referred him for physical therapy.  Light stretches and gentle massages recommended as well as warm soaks.  Prescribed him methocarbamol for symptomatic muscle spasms.\par \par If his symptoms persist over the next 2 weeks or worsen he has been instructed to follow-up with one of the sports orthopedist in this office for further evaluation and management.  We discussed the option of further imaging to include an MRI but he would like to hold off on that at this point.\par \par I will see him back in 4 to 6 weeks on the basis prior to that as discussed above

## 2023-07-26 NOTE — PHYSICAL EXAM
[Stooped] : stooped [LE] : Sensory: Intact in bilateral lower extremities [DP] : dorsalis pedis 2+ and symmetric bilaterally [Antalgic] : antalgic [SLR] : negative straight leg raise [de-identified] : 5/5 EHL, ankle DF on left, 5/5 knee extension, hip flexion, plantar flexion\par  [de-identified] : thoracic kyphosis\par ecchymosis left calf, some edema with tenderness.  Minimal Homans' sign

## 2023-07-26 NOTE — HISTORY OF PRESENT ILLNESS
[Daily] : ~He/She~ states the symptoms seem to be occuring daily [Improving] : improving [___ wks] : [unfilled] week(s) ago [de-identified] : Patient is here today for pain in his left calf pain over one week felt a stab while playing pickle ball.\par Has been playing a lot of pickleball.\par Took a step while walking and felt a stab in back of calf. pain is intermittent. pain in morning, cannot walk. improves over time. keeps him awake at night. using biofreeze, stretching\par  Patient states pain ranges from 5-9 worse with sleeping and in the am.\par  [de-identified] : in the am and sleeping  [de-identified] : compression stocking and a lot of biofreeze

## 2023-09-14 ENCOUNTER — APPOINTMENT (OUTPATIENT)
Dept: ENDOCRINOLOGY | Facility: CLINIC | Age: 63
End: 2023-09-14
Payer: COMMERCIAL

## 2023-09-14 DIAGNOSIS — M80.80XA OTHER OSTEOPOROSIS WITH CURRENT PATHOLOGICAL FRACTURE, UNSPECIFIED SITE, INITIAL ENCOUNTER FOR FRACTURE: ICD-10-CM

## 2023-09-14 PROCEDURE — 96401 CHEMO ANTI-NEOPL SQ/IM: CPT

## 2023-09-14 RX ADMIN — DENOSUMAB 60 MG/ML: 60 INJECTION SUBCUTANEOUS at 00:00

## 2023-09-15 RX ORDER — DENOSUMAB 60 MG/ML
60 INJECTION SUBCUTANEOUS
Qty: 1 | Refills: 0 | Status: COMPLETED | OUTPATIENT
Start: 2023-09-14

## 2023-10-25 ENCOUNTER — RX RENEWAL (OUTPATIENT)
Age: 63
End: 2023-10-25

## 2023-10-25 RX ORDER — ERGOCALCIFEROL 1.25 MG/1
1.25 MG CAPSULE, LIQUID FILLED ORAL
Qty: 12 | Refills: 1 | Status: ACTIVE | COMMUNITY
Start: 2021-07-03 | End: 1900-01-01

## 2023-11-10 NOTE — ED ADULT NURSE NOTE - NS ED NURSE RECORD ANOTHER VITAL SIGN
11/10/2023  Tonie Tejada is a 82 y.o., female.    Pre-op Diagnosis: Gangrene of hemorrhoid [K64.8, I96]    Procedure(s): Exam under anesthesia     10/20/2023 Assessment/Plan:  Acute GI bleed/melena  CKD 3b -at baseline     History of chronic hepatitis-B on Vemlidy, HTN, HLD, T2DM, TIA on aspirin, osteoarthritis    Review of patient's allergies indicates:  No Known Allergies    Current Outpatient Medications   Medication Instructions    albuterol (PROVENTIL/VENTOLIN HFA) 90 mcg/actuation inhaler albuterol sulfate HFA 90 mcg/actuation aerosol inhaler   Inhale 1 puff as needed by inhalation route for 17 days.    amLODIPine (NORVASC) 5 MG tablet amlodipine 5 mg tablet   TAKE 1 TABLET BY MOUTH ONCE DAILY    aspirin (ECOTRIN) 81 MG EC tablet Adult Low Dose Aspirin 81 mg tablet,delayed release   Take 1 tablet every day by oral route.    diclofenac sodium (VOLTAREN) 2 g, Topical (Top), Daily    hydrocortisone 1 % cream Topical (Top), 2 times daily    hyoscyamine 0.125 mg, Sublingual, Every 4 hours PRN    metoprolol tartrate (LOPRESSOR) 25 mg, Oral, 2 times daily    omeprazole (PRILOSEC) 40 MG capsule 1 capsule 30 minutes before morning meal    pioglitazone (ACTOS) 30 mg, Oral    simvastatin (ZOCOR) 20 MG tablet simvastatin 20 mg tablet   TAKE 1 TABLET BY MOUTH ONCE DAILY AT BEDTIME    tamsulosin (FLOMAX) 0.4 mg, Oral, Daily    tenofovir alafenamide (VEMLIDY) 25 mg Tab Vemlidy 25 mg tablet  TAKE 1 TABLET BY MOUTH ONCE DAILY       Past Medical History:   Diagnosis Date    Chronic hepatitis B     DM (diabetes mellitus)     HLD (hyperlipidemia)     HTN (hypertension)     OA (osteoarthritis)     TIA (transient ischemic attack)        Past Surgical History:   Procedure Laterality Date     SECTION      COLONOSCOPY N/A 10/21/2023    Procedure: COLONOSCOPY;  Surgeon: Jasper Davis MD;   Location: [facility] OR;  Service: Gastroenterology;  Laterality: N/A;    COLONOSCOPY, WITH POLYPECTOMY USING SNARE  10/21/2023    Procedure: COLONOSCOPY, WITH POLYPECTOMY USING SNARE;  Surgeon: Jasper Davis MD;  Location: [facility] OR;  Service: Gastroenterology;;    ESOPHAGOGASTRODUODENOSCOPY N/A 10/20/2023    Procedure: EGD;  Surgeon: Cody Urbina MD;  Location[facility] ENDOSCOPY;  Service: Gastroenterology;  Laterality: N/A;    HYSTERECTOMY      JOINT REPLACEMENT       Lab Results   Component Value Date    WBC 7.22 11/10/2023    HGB 9.0 (L) 11/10/2023    HCT 28.7 (L) 11/10/2023    MCV 90.8 11/10/2023     11/10/2023   BMP  Lab Results   Component Value Date     11/10/2023    K 4.4 11/10/2023    CO2 25 11/10/2023    BUN 11.9 11/10/2023    CREATININE 1.45 (H) 11/10/2023    CALCIUM 9.1 11/10/2023    EGFRNONAA 51 11/26/2021          TTE 07/19/22 Interpretation Summary  · The estimated ejection fraction is 55%.  · Normal systolic function.  · Normal left ventricular diastolic function.  · Normal right ventricular size.  · Mild-to-moderate aortic regurgitation.  · Normal central venous pressure (3 mmHg).  · The estimated PA systolic pressure is 24 mmHg.    CUS 11/25/2021       TTE 11/26/2021      Pre-op Assessment    I have reviewed the Patient Summary Reports.    I have reviewed the NPO Status.   I have reviewed the Medications.     Review of Systems  Anesthesia Hx:  No problems with previous Anesthesia  Denies Family Hx of Anesthesia complications.  Personal Hx of Anesthesia complications Slow To Awaken/Delayed Emergence   Social:  Non-Smoker    Cardiovascular:   Exercise tolerance: good Hypertension  Denies Angina.  Denies Orthopnea.  Denies PND. hyperlipidemia  Denies YUN.  Functional Capacity good / => 4 METS    Pulmonary:   Shortness of breath    Hepatic/GI:   GERD Hepatitis, B    Musculoskeletal:  Musculoskeletal Normal    Neurological:   TIA, Denies CVA.    Endocrine:   Diabetes     Psych:  Psychiatric Normal           Physical Exam  General: Well nourished, Alert and Oriented    Airway:  Mallampati: III   Mouth Opening: Normal  TM Distance: Normal  Tongue: Normal  Neck ROM: Normal ROM    Dental:MULTIPLE CHIPPED TEETH. MISSING L UPPER PREMOLAR, SOME MOLARS  Chest/Lungs:  Clear to auscultation    Heart:  Rate: Normal  Rhythm: Regular Rhythm  No pretibial edema  No carotid bruits      Anesthesia Plan  Type of Anesthesia, risks & benefits discussed:    Anesthesia Type: Gen Supraglottic Airway  Intra-op Monitoring Plan: Standard ASA Monitors  Post Op Pain Control Plan: multimodal analgesia  Induction:  IV  Airway Plan: Direct, Post-Induction  Informed Consent: Informed consent signed with the Patient and all parties understand the risks and agree with anesthesia plan.  All questions answered. Patient consented to blood products? No  ASA Score: 3  Day of Surgery Review of History & Physical: H&P Update referred to the surgeon/provider.    Ready For Surgery From Anesthesia Perspective.     .       Yes

## 2023-12-06 ENCOUNTER — APPOINTMENT (OUTPATIENT)
Dept: ORTHOPEDIC SURGERY | Facility: CLINIC | Age: 63
End: 2023-12-06

## 2024-02-26 DIAGNOSIS — Z13.1 ENCOUNTER FOR SCREENING FOR DIABETES MELLITUS: ICD-10-CM

## 2024-02-27 ENCOUNTER — APPOINTMENT (OUTPATIENT)
Dept: INTERNAL MEDICINE | Facility: CLINIC | Age: 64
End: 2024-02-27
Payer: COMMERCIAL

## 2024-02-27 VITALS
DIASTOLIC BLOOD PRESSURE: 84 MMHG | OXYGEN SATURATION: 98 % | HEART RATE: 84 BPM | TEMPERATURE: 97.5 F | SYSTOLIC BLOOD PRESSURE: 132 MMHG | BODY MASS INDEX: 27.64 KG/M2 | RESPIRATION RATE: 16 BRPM | HEIGHT: 63 IN | WEIGHT: 156 LBS

## 2024-02-27 DIAGNOSIS — Z13.31 ENCOUNTER FOR SCREENING FOR DEPRESSION: ICD-10-CM

## 2024-02-27 DIAGNOSIS — Z94.4 LIVER TRANSPLANT STATUS: ICD-10-CM

## 2024-02-27 DIAGNOSIS — D69.6 THROMBOCYTOPENIA, UNSPECIFIED: ICD-10-CM

## 2024-02-27 DIAGNOSIS — Z00.00 ENCOUNTER FOR GENERAL ADULT MEDICAL EXAMINATION W/OUT ABNORMAL FINDINGS: ICD-10-CM

## 2024-02-27 DIAGNOSIS — M54.9 DORSALGIA, UNSPECIFIED: ICD-10-CM

## 2024-02-27 DIAGNOSIS — I25.10 ATHEROSCLEROTIC HEART DISEASE OF NATIVE CORONARY ARTERY W/OUT ANGINA PECTORIS: ICD-10-CM

## 2024-02-27 DIAGNOSIS — R60.0 LOCALIZED EDEMA: ICD-10-CM

## 2024-02-27 DIAGNOSIS — Z23 ENCOUNTER FOR IMMUNIZATION: ICD-10-CM

## 2024-02-27 PROCEDURE — 90677 PCV20 VACCINE IM: CPT

## 2024-02-27 PROCEDURE — 99397 PER PM REEVAL EST PAT 65+ YR: CPT | Mod: 25

## 2024-02-27 PROCEDURE — G2211 COMPLEX E/M VISIT ADD ON: CPT | Mod: NC,1L

## 2024-02-27 PROCEDURE — 99213 OFFICE O/P EST LOW 20 MIN: CPT | Mod: 25

## 2024-02-27 PROCEDURE — G0009: CPT

## 2024-02-27 PROCEDURE — G0444 DEPRESSION SCREEN ANNUAL: CPT | Mod: 59

## 2024-02-27 RX ORDER — ERGOCALCIFEROL 1.25 MG/1
1.25 MG CAPSULE ORAL
Qty: 12 | Refills: 0 | Status: DISCONTINUED | COMMUNITY
Start: 2019-08-12 | End: 2024-02-27

## 2024-02-27 RX ORDER — TRAMADOL HYDROCHLORIDE 100 MG/1
100 TABLET, EXTENDED RELEASE ORAL
Qty: 30 | Refills: 0 | Status: DISCONTINUED | COMMUNITY
Start: 2021-04-22 | End: 2024-02-27

## 2024-02-27 RX ORDER — POLYETHYLENE GLYCOL 3350 AND ELECTROLYTES WITH LEMON FLAVOR 236; 22.74; 6.74; 5.86; 2.97 G/4L; G/4L; G/4L; G/4L; G/4L
236 POWDER, FOR SOLUTION ORAL
Qty: 4000 | Refills: 0 | Status: DISCONTINUED | COMMUNITY
Start: 2021-08-17 | End: 2024-02-27

## 2024-02-27 RX ORDER — LIDOCAINE 5% 700 MG/1
5 PATCH TOPICAL
Qty: 1 | Refills: 2 | Status: DISCONTINUED | COMMUNITY
Start: 2022-12-14 | End: 2024-02-27

## 2024-02-27 RX ORDER — TRAMADOL HYDROCHLORIDE 50 MG/1
50 TABLET, COATED ORAL
Qty: 42 | Refills: 0 | Status: ACTIVE | COMMUNITY
Start: 2024-02-27 | End: 1900-01-01

## 2024-02-27 RX ORDER — FUROSEMIDE 40 MG/1
40 TABLET ORAL TWICE DAILY
Qty: 180 | Refills: 0 | Status: ACTIVE | COMMUNITY
Start: 1900-01-01 | End: 1900-01-01

## 2024-02-27 RX ORDER — CALCIUM CITRATE/VITAMIN D3 200MG-6.25
TABLET ORAL
Refills: 0 | Status: DISCONTINUED | COMMUNITY
End: 2024-02-27

## 2024-02-27 RX ORDER — EVEROLIMUS 0.5 MG/1
0.5 TABLET ORAL
Refills: 0 | Status: ACTIVE | COMMUNITY

## 2024-02-27 RX ORDER — MULTIVIT-MIN/FOLIC/VIT K/LYCOP 400-300MCG
TABLET ORAL
Qty: 30 | Refills: 0 | Status: DISCONTINUED | COMMUNITY
Start: 2021-04-03 | End: 2024-02-27

## 2024-02-27 RX ORDER — PREDNISONE 5 MG/1
5 TABLET ORAL DAILY
Refills: 0 | Status: ACTIVE | COMMUNITY

## 2024-02-27 RX ORDER — MAGNESIUM OXIDE TAB 400 MG (241.3 MG ELEMENTAL MG) 400 (241.3 MG) MG
400 (241.3 MG) TAB ORAL
Qty: 60 | Refills: 0 | Status: DISCONTINUED | COMMUNITY
Start: 2021-01-20 | End: 2024-02-27

## 2024-02-27 NOTE — ADDENDUM
[FreeTextEntry1] : I, Junerobe Koromaryan, acted solely as scribe for Dr. Portillo Mckeon DO on this date 02/27/2024.  All medical record entries made by the Scribe were at my, Dr. Portillo Mckeon DO direction and personally dictated by me on 02/27/2024. I have reviewed the chart and agree that the record accurately reflects my personal performance of the history, physical exam, assessment and plan. I have also personally directed, reviewed and agreed with the chart.

## 2024-02-27 NOTE — PLAN
[FreeTextEntry1] : ENT decreased hearing - continue wearing hearing aides - continue to follow up with ENT specialist, Dr. Honeycutt Cardiology arteriosclerosis of coronary artery - s/p CABG - continue Aspirin 81mg p.o.q.d. as directed; continue Atorvastatin Calcium 10mg p.o.q.d. as directed - continue to follow up with cardiologist, Dr. Quan Musculoskeletal L3/T12 fracture - s/p kyphoplasty; s/p PT - continue Tramadol HCl 50mg p.r.n., Rx filled,  reviewed, Reference #:[id number] - continue to follow up with orthopedic surgeon, Dr. Espana Gastroenterology cirrhotic liver - s/p transplantation - continue Furosemide 40mg p.o. BID as directed, Rx filled; continue Everolimus 0.5mg p.o. BID as directed; continue Tacrolimus (Prograf) 1mg in AM and 0.5mg in PM p.o.q.d. as directed; continue Prednisone as directed; continue Potassium Chloride ER 10mg p.o.q.d. as directed, Rx filled - continue to follow up hepatologist, Dr. Dinh Immunization Prevnar 20 - Prevnar 20 0.5mL x1 administered intramuscularly to left deltoid   check EKG (as above) Rx given for blood work - check hemoglobin A1C, Activated Partial Thromboplastin Time, CBC, CMP, FLP, PSA, Prothrombin Time and INR, TSH, UA, and Vitamin D; recommended to go to a [facility]

## 2024-02-27 NOTE — ASSESSMENT
[FreeTextEntry1] : Patient is a 64-year-old male with a past medical history as above who presents for an annual wellness visit.

## 2024-02-27 NOTE — PHYSICAL EXAM
[Well Nourished] : well nourished [No Acute Distress] : no acute distress [Well Developed] : well developed [Well-Appearing] : well-appearing [Normal Sclera/Conjunctiva] : normal sclera/conjunctiva [PERRL] : pupils equal round and reactive to light [EOMI] : extraocular movements intact [Normal Oropharynx] : the oropharynx was normal [Normal Outer Ear/Nose] : the outer ears and nose were normal in appearance [No JVD] : no jugular venous distention [Supple] : supple [No Lymphadenopathy] : no lymphadenopathy [Thyroid Normal, No Nodules] : the thyroid was normal and there were no nodules present [No Respiratory Distress] : no respiratory distress  [Clear to Auscultation] : lungs were clear to auscultation bilaterally [No Accessory Muscle Use] : no accessory muscle use [Regular Rhythm] : with a regular rhythm [Normal Rate] : normal rate  [Normal S1, S2] : normal S1 and S2 [No Murmur] : no murmur heard [No Abdominal Bruit] : a ~M bruit was not heard ~T in the abdomen [No Carotid Bruits] : no carotid bruits [Pedal Pulses Present] : the pedal pulses are present [No Edema] : there was no peripheral edema [No Palpable Aorta] : no palpable aorta [No Extremity Clubbing/Cyanosis] : no extremity clubbing/cyanosis [Non Tender] : non-tender [Soft] : abdomen soft [Non-distended] : non-distended [No Masses] : no abdominal mass palpated [No HSM] : no HSM [Normal Bowel Sounds] : normal bowel sounds [Normal Posterior Cervical Nodes] : no posterior cervical lymphadenopathy [Normal Anterior Cervical Nodes] : no anterior cervical lymphadenopathy [No CVA Tenderness] : no CVA  tenderness [No Spinal Tenderness] : no spinal tenderness [No Joint Swelling] : no joint swelling [Grossly Normal Strength/Tone] : grossly normal strength/tone [No Rash] : no rash [Coordination Grossly Intact] : coordination grossly intact [No Focal Deficits] : no focal deficits [Normal Gait] : normal gait [Deep Tendon Reflexes (DTR)] : deep tendon reflexes were 2+ and symmetric [Normal Affect] : the affect was normal [Normal] : affect was normal and insight and judgment were intact [Normal Insight/Judgement] : insight and judgment were intact [Normal Voice/Communication] : normal voice/communication [Normal TMs] : both tympanic membranes were normal [Normal Supraclavicular Nodes] : no supraclavicular lymphadenopathy [Kyphosis] : kyphosis [Acne] : no acne [Speech Grossly Normal] : speech grossly normal [Memory Grossly Normal] : memory grossly normal [Alert and Oriented x3] : oriented to person, place, and time [Normal Mood] : the mood was normal [de-identified] : spider veins bilaterally, 2+ pitting edema bilaterally [de-identified] : umbilical hernia, incisional hernias, ascites

## 2024-02-27 NOTE — HEALTH RISK ASSESSMENT
[No] : In the past 12 months have you used drugs other than those required for medical reasons? No [PHQ-2 Negative - No further assessment needed] : PHQ-2 Negative - No further assessment needed [0] : 2) Feeling down, depressed, or hopeless: Not at all (0) [Never] : Never [Never (0 pts)] : Never (0 points) [Audit-CScore] : 0 [CND6Evflb] : 0

## 2024-02-27 NOTE — HISTORY OF PRESENT ILLNESS
[FreeTextEntry1] : Annual wellness visit  [de-identified] : Patient is a 64-year-old male with a past medical history as below who presents for an annual wellness visit. Patient states he is taking all medications as prescribed and denies any adverse reactions or side effects. He denies any new arthralgias or myalgias on Atorvastatin Calcium. GERD has been well-managed on Pantoprazole Sodium. Patient has received the flu vaccine for this season. Patient is vaccinated against COVID-19 (x5). Patient has not received the Shingles (Shingrix) vaccine. Patient inquires about receiving the Prevnar 20 vaccine today in office.   Patient sees hepatologist, Dr. Dinh given history of cirrhotic liver, s/p transplantation. Patient was found to have consistently elevated bilirubin levels in his blood work in the latter half of 2023 and was subsequently sent for a transjugular liver biopsy; results demonstrated inflammation, fibrosis, and portal vein hypertension (15). Treatment included ERCP and a stent placement. Patient reports having developed pancreatitis following the ERCP and had a paracentesis procedure performed (4L fluids removed). Current medication regimen includes Everolimus, Prednisone 15mg (scheduled to go down to 10mg next week), Lasix, Tacrolimus, and Potassium 10mg.   Patient reports undergoing a paracentesis procedure every 2-3 weeks as the fluid buildup tends to lead to discomfort. His last procedure was performed 2 weeks ago with approximately 2L of fluid removed.   Patient sees endocrinologist, Dr. Angel given history of osteoporosis; currently on Prolia.  Patient has received hearing aides from ENT specialist Dr. Honeycutt secondary to decreased hearing. He has been wearing them consistently and reports improvement in hearing.   Patient requests Rx refills for Lasix and Potassium Chloride at this time.  Patient also requests Rx refill for Tramadol which he takes for lower back pain. He states that the medication helps.  Patient has not fasted today.

## 2024-03-02 ENCOUNTER — LABORATORY RESULT (OUTPATIENT)
Age: 64
End: 2024-03-02

## 2024-03-19 ENCOUNTER — APPOINTMENT (OUTPATIENT)
Dept: ENDOCRINOLOGY | Facility: CLINIC | Age: 64
End: 2024-03-19

## 2024-04-15 ENCOUNTER — APPOINTMENT (OUTPATIENT)
Dept: ENDOCRINOLOGY | Facility: CLINIC | Age: 64
End: 2024-04-15
Payer: COMMERCIAL

## 2024-04-15 VITALS
OXYGEN SATURATION: 98 % | HEIGHT: 62.9 IN | WEIGHT: 150 LBS | BODY MASS INDEX: 26.58 KG/M2 | SYSTOLIC BLOOD PRESSURE: 116 MMHG | HEART RATE: 80 BPM | DIASTOLIC BLOOD PRESSURE: 66 MMHG

## 2024-04-15 DIAGNOSIS — Z79.52 LONG TERM (CURRENT) USE OF SYSTEMIC STEROIDS: ICD-10-CM

## 2024-04-15 DIAGNOSIS — M81.0 AGE-RELATED OSTEOPOROSIS W/OUT CURRENT PATHOLOGICAL FRACTURE: ICD-10-CM

## 2024-04-15 PROCEDURE — ZZZZZ: CPT

## 2024-04-15 PROCEDURE — 96401 CHEMO ANTI-NEOPL SQ/IM: CPT

## 2024-04-15 PROCEDURE — 99214 OFFICE O/P EST MOD 30 MIN: CPT | Mod: 25

## 2024-04-15 PROCEDURE — 77080 DXA BONE DENSITY AXIAL: CPT

## 2024-04-15 RX ORDER — DENOSUMAB 60 MG/ML
60 INJECTION SUBCUTANEOUS
Qty: 1 | Refills: 0 | Status: COMPLETED | OUTPATIENT
Start: 2024-04-15

## 2024-04-15 RX ADMIN — DENOSUMAB 60 MG/ML: 60 INJECTION SUBCUTANEOUS at 00:00

## 2024-04-16 PROBLEM — Z79.52 LONG TERM (CURRENT) USE OF SYSTEMIC STEROIDS: Status: ACTIVE | Noted: 2021-01-13

## 2024-04-16 PROBLEM — M81.0 OSTEOPOROSIS WITHOUT CURRENT PATHOLOGICAL FRACTURE, UNSPECIFIED OSTEOPOROSIS TYPE: Status: ACTIVE | Noted: 2024-04-16

## 2024-04-16 RX ORDER — DENOSUMAB 60 MG/ML
60 INJECTION SUBCUTANEOUS
Qty: 1 | Refills: 0 | Status: COMPLETED | OUTPATIENT
Start: 2024-04-16

## 2024-04-16 RX ADMIN — DENOSUMAB 60 MG/ML: 60 INJECTION SUBCUTANEOUS at 00:00

## 2024-04-16 NOTE — ASSESSMENT
[Denosumab Therapy] : Risks  and benefits of denosumab therapy were discussed with the patient including eczema, cellulitis, osteonecrosis of the jaw and atypical femur fractures [FreeTextEntry1] : Complicated 64 year-old male presents with progressive osteoporosis and fractures.   BMD 9/2019 was moderately low but patient did not begin medical therapy for osteoporosis. The patient does have a previous history of apparently calcium oxalate kidney stones. Medical history is notable for recent liver transplantation for cryptogenic cirrhosis. Patient is on steroids as part of transplant treatment. He had L3 and severe T12 compression fractures with fairly severe pain. Pt had kyphoplasty 1/2021 w/ some relief in pain. BMD 1/2021 showed progressive worsening osteoporosis spine and hip versus outside study 2019 as well as severely low proximal radius. The patient is at extremely high risk for future fractures. Options of medical therapy were discussed in great detail with patient and spouse. Pt started Tymlos 1/2021. Taken correctly but he reported headaches and back pains for half hour after injection. Transitioned to Forteo 6/2021. Taken correctly, tolerated well. No blood Ca elevation, lightheadedness, palpitations, or dizziness. BMD 1/2022 indicated improved osteoporosis in spine, worsened low osteoporosis in hip, and severely low osteoporosis in proximal radius. Began Prolia February 2022 after Forteo. Tolerating well. Repeat bone density 2023 stabilized and increased numerically although of borderline statistical significance. Had recent complications including need for biliary stent for elevated bilirubin which is complicated by abdominal infections.  Patient has had recurrent ascites requiring very strict fluid restrictions and salt restriction.  Patient does have a history of kidney stones and developed hydronephrosis of right kidney requiring stent placement which may be removed soon. Patient is on Prednisone 10 mg/day.  Labs 03/2024: Ca 9.5, Crea 1.09, Bili 2.8, AlkPhos 361, VitD 55.9, TSH 2.86  BMD 04/2024 shows stable osteoporosis in the total hip, fem neck, and prox radius. Spine not performed due to severe kyphosis. BMD results reviewed w/ pt.   Continue Prolia from [facility].  F/u in 6 months for Prolia shot.

## 2024-04-16 NOTE — HISTORY OF PRESENT ILLNESS
[Abaloparatide (Tymlos)] : Abaloparatide [Teriparatide (Forteo)] : Teriparatide [Taking Steroids] : a history of taking steroids [Previous Fragility Fracture] : previous fragility fracture(s) [FreeTextEntry1] :    Follow-up for transplant osteoporosis. History of liver transplant November 2020 for cryptogenic cirrhosis. The patient has a complicated recent history including elevation of portal hypertension as well as elevated bilirubin.  He eventually had a biliary stent placed complicated by infection.  He also developed recurrent ascites requiring very strict fluid restriction.  He has since had the stent removed. Additional interval history includes hydronephrosis of the right kidney requiring stent placement. The patient has no previous history of significant bone disease. He did have previous metatarsal fractures and a traumatic radius fracture. BMD 9/2019 was moderately low. Spine -2.7 total hip -2.3 femoral neck -2.7. The patient did not begin medical osteoporosis therapy at that time. The patient has been on prednisone as part of the liver transplant therapy since 11/2020 decreased to 5 mg. The patient developed severe back pain has been found to have T12 and L3 compression fractures. Pt had kyphoplasty 1/2021 w/ some relief in pain. BMD 1/2021 showed progressive worsening osteoporosis spine and hip versus outside study 2019 as well as severely low proximal radius. Pt started Tymlos 1/2021. Took correctly but had headaches and back pains for half hour after injection. Transitioned to Forteo 6/2021. Taken correctly, tolerated well. No blood Ca elevation, lightheadedness, palpitations, or dizziness. Back pain significantly improved, now has minimal back pain. Patient is now working mostly full time, can do moderate social activities like golfing. Transitioned to Prolia in 02/2022 after Forteo.  Labs 03/2024 Ca 9.5 Crea 1.09 Bili 2.8 AlkPhos 361 VitD 55.9 TSH 2.86   developed cryptogenic cirrhosis eventually leading to liver transplantation November 2020. Previously patient had history of coronary artery disease status post bypass surgery December 2019 Long history of kidney stones last passed 2019.  Patient developed hydronephrosis requiring a stent. Pt c/o of clots in urine, f/u with urologist.  Added Flomax

## 2024-04-16 NOTE — PHYSICAL EXAM
[Alert] : alert [Normal Sclera/Conjunctiva] : normal sclera/conjunctiva [EOMI] : extra ocular movement intact [No Proptosis] : no proptosis [Thyroid Not Enlarged] : the thyroid was not enlarged [No Thyroid Nodules] : no palpable thyroid nodules [Clear to Auscultation] : lungs were clear to auscultation bilaterally [Normal S1, S2] : normal S1 and S2 [Normal Rate] : heart rate was normal [Regular Rhythm] : with a regular rhythm [No Edema] : no peripheral edema [Normal Bowel Sounds] : normal bowel sounds [Not Tender] : non-tender [Not Distended] : not distended [Soft] : abdomen soft [Normal Anterior Cervical Nodes] : no anterior cervical lymphadenopathy [No Spinal Tenderness] : no spinal tenderness [Kyphosis] : kyphosis present [No Stigmata of Cushings Syndrome] : no stigmata of Cushings Syndrome [Normal Reflexes] : deep tendon reflexes were 2+ and symmetric [Oriented x3] : oriented to person, place, and time [de-identified] : anicteric [de-identified] : Positive sternotomy scar.  Firm nodules presumably previous sternotomy wires present. [de-identified] : Patient has mild superficial herniation in the area of scar for liver transplantation. [de-identified] : severe kyphosis [de-identified] :  Ambulates with cane [de-identified] : Mild erythema right lower extremity

## 2024-04-16 NOTE — PROCEDURE
[FreeTextEntry1] : BMD 04/15/2024 compared to 2023, use of prednisone Total Hip: -3.1, osteoporosis, ns Spine: not performed Femoral Neck: -3.4, osteoporosis, ns Forearm: -5.2, osteoporosis, ns  Bone mineral density March 13, 2023 Comparison to 2022 Spine not performed Total hip -3.1 osteoporosis no significant change  femoral neck -3.5 osteoporosis no significant  proximal radius -5.1 osteoporosis +4.3  Bone mineral density: 01/31/2022  Indication: vs. 2021 Spine: (L4 only) -2.7 osteoporosis, prior -3.2 osteoporosis Total hip: -3.2 osteoporosis, -7.6% Femoral neck: -3.7 osteoporosis, -8.4% Proximal radius: -5.5 osteoporosis, no significant change  Bone mineral density January 11, 2021 Indication recent compression fractures new chronic steroids. Compared to outside study 2019 Spine excluding compression L3 -3.4 osteoporosis prior -2.7 Total hip -2.9 osteoporosis prior -2.3 Femoral neck -3.4 osteoporosis prior  Proximal radius -5.3, severe osteoporosis no prior -2.7

## 2024-04-16 NOTE — END OF VISIT
[FreeTextEntry3] :  I, Eulalia Jovel, authored this note working as a medical scribe for Dr. Angel.  04/15/2024 .  This note was authored by the medical scribe for me. I have reviewed, edited, and revised the note as needed. I am in agreement with the exam findings, imaging findings, and treatment plan.  Maykel Angel MD

## 2024-05-28 ENCOUNTER — RX RENEWAL (OUTPATIENT)
Age: 64
End: 2024-05-28

## 2024-05-28 RX ORDER — POTASSIUM CHLORIDE 750 MG/1
10 TABLET, FILM COATED, EXTENDED RELEASE ORAL
Qty: 90 | Refills: 0 | Status: ACTIVE | COMMUNITY
Start: 2024-02-27 | End: 1900-01-01

## 2024-07-12 NOTE — REVIEW OF SYSTEMS
Bed: 19  Expected date:   Expected time:   Means of arrival:   Comments:  melly   [Negative] : Heme/Lymph

## 2024-07-22 ENCOUNTER — RX RENEWAL (OUTPATIENT)
Age: 64
End: 2024-07-22

## 2024-08-20 ENCOUNTER — RX RENEWAL (OUTPATIENT)
Age: 64
End: 2024-08-20

## 2024-10-16 ENCOUNTER — APPOINTMENT (OUTPATIENT)
Dept: ENDOCRINOLOGY | Facility: CLINIC | Age: 64
End: 2024-10-16
Payer: COMMERCIAL

## 2024-10-16 VITALS
HEIGHT: 62 IN | SYSTOLIC BLOOD PRESSURE: 130 MMHG | BODY MASS INDEX: 29.63 KG/M2 | DIASTOLIC BLOOD PRESSURE: 72 MMHG | OXYGEN SATURATION: 98 % | HEART RATE: 85 BPM | WEIGHT: 161 LBS

## 2024-10-16 DIAGNOSIS — M40.209 UNSPECIFIED KYPHOSIS, SITE UNSPECIFIED: ICD-10-CM

## 2024-10-16 DIAGNOSIS — Z79.52 LONG TERM (CURRENT) USE OF SYSTEMIC STEROIDS: ICD-10-CM

## 2024-10-16 DIAGNOSIS — M80.80XA OTHER OSTEOPOROSIS WITH CURRENT PATHOLOGICAL FRACTURE, UNSPECIFIED SITE, INITIAL ENCOUNTER FOR FRACTURE: ICD-10-CM

## 2024-10-16 PROCEDURE — 99214 OFFICE O/P EST MOD 30 MIN: CPT | Mod: 25

## 2024-10-16 PROCEDURE — 96401 CHEMO ANTI-NEOPL SQ/IM: CPT

## 2024-10-16 RX ORDER — DENOSUMAB 60 MG/ML
60 INJECTION SUBCUTANEOUS
Qty: 1 | Refills: 0 | Status: COMPLETED | OUTPATIENT
Start: 2024-10-16

## 2024-10-16 RX ADMIN — DENOSUMAB 60 MG/ML: 60 INJECTION SUBCUTANEOUS at 00:00

## 2024-12-15 NOTE — ED ADULT TRIAGE NOTE - MEANS OF ARRIVAL
Lab Results   Component Value Date    EGFR 9 12/15/2024    EGFR 10 12/14/2024    EGFR 11 11/20/2024    CREATININE 5.70 (H) 12/15/2024    CREATININE 5.30 (H) 12/14/2024    CREATININE 4.69 (H) 11/20/2024      ambulatory

## 2025-01-16 ENCOUNTER — RX RENEWAL (OUTPATIENT)
Age: 65
End: 2025-01-16

## 2025-04-16 ENCOUNTER — APPOINTMENT (OUTPATIENT)
Dept: ENDOCRINOLOGY | Facility: CLINIC | Age: 65
End: 2025-04-16

## 2025-04-16 ENCOUNTER — APPOINTMENT (OUTPATIENT)
Dept: ENDOCRINOLOGY | Facility: CLINIC | Age: 65
End: 2025-04-16
Payer: COMMERCIAL

## 2025-04-16 VITALS — BODY MASS INDEX: 31.67 KG/M2 | HEIGHT: 63.5 IN | WEIGHT: 181 LBS

## 2025-04-16 VITALS
WEIGHT: 181 LBS | HEART RATE: 83 BPM | DIASTOLIC BLOOD PRESSURE: 80 MMHG | OXYGEN SATURATION: 98 % | HEIGHT: 63.5 IN | SYSTOLIC BLOOD PRESSURE: 132 MMHG | BODY MASS INDEX: 31.67 KG/M2

## 2025-04-16 DIAGNOSIS — M40.209 UNSPECIFIED KYPHOSIS, SITE UNSPECIFIED: ICD-10-CM

## 2025-04-16 DIAGNOSIS — M80.80XA OTHER OSTEOPOROSIS WITH CURRENT PATHOLOGICAL FRACTURE, UNSPECIFIED SITE, INITIAL ENCOUNTER FOR FRACTURE: ICD-10-CM

## 2025-04-16 DIAGNOSIS — Z79.52 LONG TERM (CURRENT) USE OF SYSTEMIC STEROIDS: ICD-10-CM

## 2025-04-16 DIAGNOSIS — M48.50XA COLLAPSED VERTEBRA, NOT ELSEWHERE CLASSIFIED, SITE UNSPECIFIED, INITIAL ENCOUNTER FOR FRACTURE: ICD-10-CM

## 2025-04-16 PROCEDURE — 99214 OFFICE O/P EST MOD 30 MIN: CPT | Mod: 25

## 2025-04-16 PROCEDURE — 77080 DXA BONE DENSITY AXIAL: CPT

## 2025-04-16 PROCEDURE — ZZZZZ: CPT

## 2025-04-16 PROCEDURE — 96401 CHEMO ANTI-NEOPL SQ/IM: CPT

## 2025-04-16 RX ADMIN — DENOSUMAB 60 MG/ML: 60 INJECTION SUBCUTANEOUS at 00:00

## 2025-04-17 RX ORDER — DENOSUMAB 60 MG/ML
60 INJECTION SUBCUTANEOUS
Qty: 1 | Refills: 0 | Status: COMPLETED | OUTPATIENT
Start: 2025-04-16

## 2025-09-14 PROBLEM — Z12.5 SCREENING FOR PROSTATE CANCER: Status: ACTIVE | Noted: 2025-09-14

## 2025-09-15 ENCOUNTER — APPOINTMENT (OUTPATIENT)
Age: 65
End: 2025-09-15

## 2025-09-15 VITALS
TEMPERATURE: 97.7 F | HEIGHT: 63.5 IN | DIASTOLIC BLOOD PRESSURE: 82 MMHG | BODY MASS INDEX: 31.5 KG/M2 | WEIGHT: 180 LBS | SYSTOLIC BLOOD PRESSURE: 134 MMHG | OXYGEN SATURATION: 98 % | HEART RATE: 70 BPM

## 2025-09-15 RX ORDER — SPIRONOLACTONE 25 MG/1
25 TABLET ORAL DAILY
Refills: 0 | Status: ACTIVE | COMMUNITY